# Patient Record
Sex: FEMALE | Employment: OTHER | URBAN - METROPOLITAN AREA
[De-identification: names, ages, dates, MRNs, and addresses within clinical notes are randomized per-mention and may not be internally consistent; named-entity substitution may affect disease eponyms.]

---

## 2017-05-12 ENCOUNTER — ANESTHESIA (OUTPATIENT)
Dept: SURGERY | Age: 77
DRG: 330 | End: 2017-05-12
Payer: MEDICARE

## 2017-05-12 ENCOUNTER — ANESTHESIA EVENT (OUTPATIENT)
Dept: SURGERY | Age: 77
DRG: 330 | End: 2017-05-12
Payer: MEDICARE

## 2017-05-12 ENCOUNTER — APPOINTMENT (OUTPATIENT)
Dept: CT IMAGING | Age: 77
DRG: 330 | End: 2017-05-12
Attending: PHYSICIAN ASSISTANT
Payer: MEDICARE

## 2017-05-12 ENCOUNTER — HOSPITAL ENCOUNTER (INPATIENT)
Age: 77
LOS: 13 days | Discharge: HOME HEALTH | DRG: 330 | End: 2017-05-25
Attending: EMERGENCY MEDICINE | Admitting: SURGERY
Payer: MEDICARE

## 2017-05-12 DIAGNOSIS — R10.31 ABDOMINAL PAIN, RIGHT LOWER QUADRANT: ICD-10-CM

## 2017-05-12 DIAGNOSIS — K37 APPENDICITIS, UNSPECIFIED APPENDICITIS TYPE: Primary | ICD-10-CM

## 2017-05-12 DIAGNOSIS — D3A.020 CARCINOID TUMOR OF APPENDIX: ICD-10-CM

## 2017-05-12 LAB
ABO + RH BLD: NORMAL
ALBUMIN SERPL BCP-MCNC: 3.5 G/DL (ref 3.5–5)
ALBUMIN/GLOB SERPL: 0.7 {RATIO} (ref 1.1–2.2)
ALP SERPL-CCNC: 43 U/L (ref 45–117)
ALT SERPL-CCNC: 30 U/L (ref 12–78)
ANION GAP BLD CALC-SCNC: 13 MMOL/L (ref 5–15)
APPEARANCE UR: ABNORMAL
AST SERPL W P-5'-P-CCNC: 29 U/L (ref 15–37)
ATRIAL RATE: 101 BPM
BACTERIA URNS QL MICRO: ABNORMAL /HPF
BASOPHILS # BLD AUTO: 0 K/UL (ref 0–0.1)
BASOPHILS # BLD: 0 % (ref 0–1)
BILIRUB SERPL-MCNC: 0.8 MG/DL (ref 0.2–1)
BILIRUB UR QL CFM: NEGATIVE
BLOOD GROUP ANTIBODIES SERPL: NORMAL
BUN SERPL-MCNC: 21 MG/DL (ref 6–20)
BUN/CREAT SERPL: 23 (ref 12–20)
CALCIUM SERPL-MCNC: 9.4 MG/DL (ref 8.5–10.1)
CALCULATED P AXIS, ECG09: 49 DEGREES
CALCULATED R AXIS, ECG10: -35 DEGREES
CALCULATED T AXIS, ECG11: 12 DEGREES
CHLORIDE SERPL-SCNC: 96 MMOL/L (ref 97–108)
CO2 SERPL-SCNC: 25 MMOL/L (ref 21–32)
COLOR UR: ABNORMAL
CREAT SERPL-MCNC: 0.92 MG/DL (ref 0.55–1.02)
DIAGNOSIS, 93000: NORMAL
EOSINOPHIL # BLD: 0.1 K/UL (ref 0–0.4)
EOSINOPHIL NFR BLD: 1 % (ref 0–7)
EPITH CASTS URNS QL MICRO: ABNORMAL /LPF
ERYTHROCYTE [DISTWIDTH] IN BLOOD BY AUTOMATED COUNT: 12.7 % (ref 11.5–14.5)
GLOBULIN SER CALC-MCNC: 4.8 G/DL (ref 2–4)
GLUCOSE SERPL-MCNC: 123 MG/DL (ref 65–100)
GLUCOSE UR STRIP.AUTO-MCNC: NEGATIVE MG/DL
HCT VFR BLD AUTO: 40.5 % (ref 35–47)
HGB BLD-MCNC: 14.3 G/DL (ref 11.5–16)
HGB UR QL STRIP: ABNORMAL
KETONES UR QL STRIP.AUTO: NEGATIVE MG/DL
LEUKOCYTE ESTERASE UR QL STRIP.AUTO: ABNORMAL
LIPASE SERPL-CCNC: 145 U/L (ref 73–393)
LYMPHOCYTES # BLD AUTO: 32 % (ref 12–49)
LYMPHOCYTES # BLD: 2.9 K/UL (ref 0.8–3.5)
MCH RBC QN AUTO: 32.6 PG (ref 26–34)
MCHC RBC AUTO-ENTMCNC: 35.3 G/DL (ref 30–36.5)
MCV RBC AUTO: 92.3 FL (ref 80–99)
MONOCYTES # BLD: 0.9 K/UL (ref 0–1)
MONOCYTES NFR BLD AUTO: 10 % (ref 5–13)
NEUTS SEG # BLD: 5.3 K/UL (ref 1.8–8)
NEUTS SEG NFR BLD AUTO: 57 % (ref 32–75)
NITRITE UR QL STRIP.AUTO: NEGATIVE
P-R INTERVAL, ECG05: 160 MS
PH UR STRIP: 6 [PH] (ref 5–8)
PLATELET # BLD AUTO: 276 K/UL (ref 150–400)
POTASSIUM SERPL-SCNC: 3.4 MMOL/L (ref 3.5–5.1)
PROT SERPL-MCNC: 8.3 G/DL (ref 6.4–8.2)
PROT UR STRIP-MCNC: 30 MG/DL
Q-T INTERVAL, ECG07: 364 MS
QRS DURATION, ECG06: 84 MS
QTC CALCULATION (BEZET), ECG08: 471 MS
RBC # BLD AUTO: 4.39 M/UL (ref 3.8–5.2)
RBC #/AREA URNS HPF: ABNORMAL /HPF (ref 0–5)
SODIUM SERPL-SCNC: 134 MMOL/L (ref 136–145)
SP GR UR REFRACTOMETRY: 1.02 (ref 1–1.03)
SPECIMEN EXP DATE BLD: NORMAL
UROBILINOGEN UR QL STRIP.AUTO: 4 EU/DL (ref 0.2–1)
VENTRICULAR RATE, ECG03: 101 BPM
WBC # BLD AUTO: 9.3 K/UL (ref 3.6–11)
WBC URNS QL MICRO: ABNORMAL /HPF (ref 0–4)

## 2017-05-12 PROCEDURE — C9290 INJ, BUPIVACAINE LIPOSOME: HCPCS | Performed by: SURGERY

## 2017-05-12 PROCEDURE — 74011250636 HC RX REV CODE- 250/636: Performed by: SURGERY

## 2017-05-12 PROCEDURE — 96375 TX/PRO/DX INJ NEW DRUG ADDON: CPT

## 2017-05-12 PROCEDURE — 74011000250 HC RX REV CODE- 250: Performed by: SURGERY

## 2017-05-12 PROCEDURE — 74011250636 HC RX REV CODE- 250/636

## 2017-05-12 PROCEDURE — 81001 URINALYSIS AUTO W/SCOPE: CPT | Performed by: PHYSICIAN ASSISTANT

## 2017-05-12 PROCEDURE — 88342 IMHCHEM/IMCYTCHM 1ST ANTB: CPT | Performed by: SURGERY

## 2017-05-12 PROCEDURE — 93005 ELECTROCARDIOGRAM TRACING: CPT

## 2017-05-12 PROCEDURE — 77030013079 HC BLNKT BAIR HGGR 3M -A: Performed by: ANESTHESIOLOGY

## 2017-05-12 PROCEDURE — 96361 HYDRATE IV INFUSION ADD-ON: CPT

## 2017-05-12 PROCEDURE — 77030018836 HC SOL IRR NACL ICUM -A: Performed by: SURGERY

## 2017-05-12 PROCEDURE — 77030008684 HC TU ET CUF COVD -B: Performed by: ANESTHESIOLOGY

## 2017-05-12 PROCEDURE — 0DTF4ZZ RESECTION OF RIGHT LARGE INTESTINE, PERCUTANEOUS ENDOSCOPIC APPROACH: ICD-10-PCS | Performed by: SURGERY

## 2017-05-12 PROCEDURE — 83690 ASSAY OF LIPASE: CPT | Performed by: PHYSICIAN ASSISTANT

## 2017-05-12 PROCEDURE — 77030002933 HC SUT MCRYL J&J -A: Performed by: SURGERY

## 2017-05-12 PROCEDURE — 77030008771 HC TU NG SALEM SUMP -A: Performed by: ANESTHESIOLOGY

## 2017-05-12 PROCEDURE — 36415 COLL VENOUS BLD VENIPUNCTURE: CPT | Performed by: PHYSICIAN ASSISTANT

## 2017-05-12 PROCEDURE — 77030019908 HC STETH ESOPH SIMS -A: Performed by: ANESTHESIOLOGY

## 2017-05-12 PROCEDURE — 74011000250 HC RX REV CODE- 250

## 2017-05-12 PROCEDURE — 74011636320 HC RX REV CODE- 636/320: Performed by: RADIOLOGY

## 2017-05-12 PROCEDURE — 77030035048 HC TRCR ENDOSC OPTCL COVD -B: Performed by: SURGERY

## 2017-05-12 PROCEDURE — 74011250636 HC RX REV CODE- 250/636: Performed by: PHYSICIAN ASSISTANT

## 2017-05-12 PROCEDURE — 77030026438 HC STYL ET INTUB CARD -A: Performed by: ANESTHESIOLOGY

## 2017-05-12 PROCEDURE — 76010000133 HC OR TIME 3 TO 3.5 HR: Performed by: SURGERY

## 2017-05-12 PROCEDURE — 77030012405 HC DRN WND ADLR -A: Performed by: SURGERY

## 2017-05-12 PROCEDURE — 77030008756 HC TU IRR SUC STRY -B: Performed by: SURGERY

## 2017-05-12 PROCEDURE — 99285 EMERGENCY DEPT VISIT HI MDM: CPT

## 2017-05-12 PROCEDURE — 65270000029 HC RM PRIVATE

## 2017-05-12 PROCEDURE — 77030020747 HC TU INSUF ENDOSC TELE -A: Performed by: SURGERY

## 2017-05-12 PROCEDURE — 77030009965 HC RELD STPLR ENDOS COVD -D: Performed by: SURGERY

## 2017-05-12 PROCEDURE — 77030022703 HC LIGASURE  BLNT LAPSCP SEAL COVD -E: Performed by: SURGERY

## 2017-05-12 PROCEDURE — 77030022704 HC SUT VLOC COVD -B: Performed by: SURGERY

## 2017-05-12 PROCEDURE — 77030011640 HC PAD GRND REM COVD -A: Performed by: SURGERY

## 2017-05-12 PROCEDURE — 77030035045 HC TRCR ENDOSC VRSPRT BLDLSS COVD -B: Performed by: SURGERY

## 2017-05-12 PROCEDURE — 77030035051: Performed by: SURGERY

## 2017-05-12 PROCEDURE — 86901 BLOOD TYPING SEROLOGIC RH(D): CPT | Performed by: SURGERY

## 2017-05-12 PROCEDURE — 77030002966 HC SUT PDS J&J -A: Performed by: SURGERY

## 2017-05-12 PROCEDURE — 74177 CT ABD & PELVIS W/CONTRAST: CPT

## 2017-05-12 PROCEDURE — 88309 TISSUE EXAM BY PATHOLOGIST: CPT | Performed by: SURGERY

## 2017-05-12 PROCEDURE — 76060000037 HC ANESTHESIA 3 TO 3.5 HR: Performed by: SURGERY

## 2017-05-12 PROCEDURE — 96365 THER/PROPH/DIAG IV INF INIT: CPT

## 2017-05-12 PROCEDURE — 74011000258 HC RX REV CODE- 258: Performed by: PHYSICIAN ASSISTANT

## 2017-05-12 PROCEDURE — 0DTJ4ZZ RESECTION OF APPENDIX, PERCUTANEOUS ENDOSCOPIC APPROACH: ICD-10-PCS | Performed by: SURGERY

## 2017-05-12 PROCEDURE — 76210000006 HC OR PH I REC 0.5 TO 1 HR: Performed by: SURGERY

## 2017-05-12 PROCEDURE — 85025 COMPLETE CBC W/AUTO DIFF WBC: CPT | Performed by: PHYSICIAN ASSISTANT

## 2017-05-12 PROCEDURE — 77030032490 HC SLV COMPR SCD KNE COVD -B: Performed by: SURGERY

## 2017-05-12 PROCEDURE — 77030031139 HC SUT VCRL2 J&J -A: Performed by: SURGERY

## 2017-05-12 PROCEDURE — 77030034849: Performed by: SURGERY

## 2017-05-12 PROCEDURE — 77030009852 HC PCH RTVR ENDOSC COVD -B: Performed by: SURGERY

## 2017-05-12 PROCEDURE — 80053 COMPREHEN METABOLIC PANEL: CPT | Performed by: PHYSICIAN ASSISTANT

## 2017-05-12 RX ORDER — LANOLIN ALCOHOL/MO/W.PET/CERES
500 CREAM (GRAM) TOPICAL DAILY
COMMUNITY

## 2017-05-12 RX ORDER — ONDANSETRON 2 MG/ML
4 INJECTION INTRAMUSCULAR; INTRAVENOUS
Status: COMPLETED | OUTPATIENT
Start: 2017-05-12 | End: 2017-05-12

## 2017-05-12 RX ORDER — SODIUM CHLORIDE 0.9 % (FLUSH) 0.9 %
5-10 SYRINGE (ML) INJECTION EVERY 8 HOURS
Status: DISCONTINUED | OUTPATIENT
Start: 2017-05-12 | End: 2017-05-12 | Stop reason: HOSPADM

## 2017-05-12 RX ORDER — HYDROMORPHONE HYDROCHLORIDE 2 MG/ML
INJECTION, SOLUTION INTRAMUSCULAR; INTRAVENOUS; SUBCUTANEOUS AS NEEDED
Status: DISCONTINUED | OUTPATIENT
Start: 2017-05-12 | End: 2017-05-12 | Stop reason: HOSPADM

## 2017-05-12 RX ORDER — ACETAMINOPHEN 325 MG/1
650 TABLET ORAL
Status: DISCONTINUED | OUTPATIENT
Start: 2017-05-13 | End: 2017-05-25 | Stop reason: HOSPADM

## 2017-05-12 RX ORDER — ONDANSETRON 2 MG/ML
INJECTION INTRAMUSCULAR; INTRAVENOUS AS NEEDED
Status: DISCONTINUED | OUTPATIENT
Start: 2017-05-12 | End: 2017-05-12 | Stop reason: HOSPADM

## 2017-05-12 RX ORDER — MIDAZOLAM HYDROCHLORIDE 1 MG/ML
INJECTION, SOLUTION INTRAMUSCULAR; INTRAVENOUS AS NEEDED
Status: DISCONTINUED | OUTPATIENT
Start: 2017-05-12 | End: 2017-05-12 | Stop reason: HOSPADM

## 2017-05-12 RX ORDER — SODIUM CHLORIDE 0.9 % (FLUSH) 0.9 %
5-10 SYRINGE (ML) INJECTION AS NEEDED
Status: DISCONTINUED | OUTPATIENT
Start: 2017-05-12 | End: 2017-05-12 | Stop reason: HOSPADM

## 2017-05-12 RX ORDER — VENLAFAXINE HYDROCHLORIDE 150 MG/1
150 CAPSULE, EXTENDED RELEASE ORAL
Status: DISCONTINUED | OUTPATIENT
Start: 2017-05-13 | End: 2017-05-25 | Stop reason: HOSPADM

## 2017-05-12 RX ORDER — HYDROMORPHONE HYDROCHLORIDE 1 MG/ML
.25-1 INJECTION, SOLUTION INTRAMUSCULAR; INTRAVENOUS; SUBCUTANEOUS
Status: DISCONTINUED | OUTPATIENT
Start: 2017-05-12 | End: 2017-05-12 | Stop reason: HOSPADM

## 2017-05-12 RX ORDER — HYDROMORPHONE HYDROCHLORIDE 1 MG/ML
0.5 INJECTION, SOLUTION INTRAMUSCULAR; INTRAVENOUS; SUBCUTANEOUS
Status: DISCONTINUED | OUTPATIENT
Start: 2017-05-12 | End: 2017-05-13

## 2017-05-12 RX ORDER — ENOXAPARIN SODIUM 100 MG/ML
40 INJECTION SUBCUTANEOUS EVERY 24 HOURS
Status: DISCONTINUED | OUTPATIENT
Start: 2017-05-13 | End: 2017-05-17

## 2017-05-12 RX ORDER — ROCURONIUM BROMIDE 10 MG/ML
INJECTION, SOLUTION INTRAVENOUS AS NEEDED
Status: DISCONTINUED | OUTPATIENT
Start: 2017-05-12 | End: 2017-05-12 | Stop reason: HOSPADM

## 2017-05-12 RX ORDER — PROPOFOL 10 MG/ML
INJECTION, EMULSION INTRAVENOUS AS NEEDED
Status: DISCONTINUED | OUTPATIENT
Start: 2017-05-12 | End: 2017-05-12 | Stop reason: HOSPADM

## 2017-05-12 RX ORDER — LIDOCAINE HYDROCHLORIDE 10 MG/ML
0.1 INJECTION, SOLUTION EPIDURAL; INFILTRATION; INTRACAUDAL; PERINEURAL AS NEEDED
Status: DISCONTINUED | OUTPATIENT
Start: 2017-05-12 | End: 2017-05-12 | Stop reason: HOSPADM

## 2017-05-12 RX ORDER — LIDOCAINE HYDROCHLORIDE 20 MG/ML
INJECTION, SOLUTION EPIDURAL; INFILTRATION; INTRACAUDAL; PERINEURAL AS NEEDED
Status: DISCONTINUED | OUTPATIENT
Start: 2017-05-12 | End: 2017-05-12 | Stop reason: HOSPADM

## 2017-05-12 RX ORDER — FENTANYL CITRATE 50 UG/ML
INJECTION, SOLUTION INTRAMUSCULAR; INTRAVENOUS AS NEEDED
Status: DISCONTINUED | OUTPATIENT
Start: 2017-05-12 | End: 2017-05-12 | Stop reason: HOSPADM

## 2017-05-12 RX ORDER — AMLODIPINE BESYLATE 5 MG/1
5 TABLET ORAL DAILY
COMMUNITY

## 2017-05-12 RX ORDER — HYDROCHLOROTHIAZIDE 25 MG/1
25 TABLET ORAL DAILY
COMMUNITY

## 2017-05-12 RX ORDER — SODIUM CHLORIDE, SODIUM LACTATE, POTASSIUM CHLORIDE, CALCIUM CHLORIDE 600; 310; 30; 20 MG/100ML; MG/100ML; MG/100ML; MG/100ML
INJECTION, SOLUTION INTRAVENOUS
Status: DISCONTINUED | OUTPATIENT
Start: 2017-05-12 | End: 2017-05-12 | Stop reason: HOSPADM

## 2017-05-12 RX ORDER — KETOROLAC TROMETHAMINE 30 MG/ML
15 INJECTION, SOLUTION INTRAMUSCULAR; INTRAVENOUS EVERY 6 HOURS
Status: DISCONTINUED | OUTPATIENT
Start: 2017-05-13 | End: 2017-05-16

## 2017-05-12 RX ORDER — FENOFIBRATE 150 MG/1
1 CAPSULE ORAL
COMMUNITY

## 2017-05-12 RX ORDER — AMLODIPINE BESYLATE 5 MG/1
5 TABLET ORAL DAILY
Status: DISCONTINUED | OUTPATIENT
Start: 2017-05-13 | End: 2017-05-25 | Stop reason: HOSPADM

## 2017-05-12 RX ORDER — LEVOTHYROXINE SODIUM 50 UG/1
50 TABLET ORAL
Status: DISCONTINUED | OUTPATIENT
Start: 2017-05-13 | End: 2017-05-25 | Stop reason: HOSPADM

## 2017-05-12 RX ORDER — GLYCOPYRROLATE 0.2 MG/ML
INJECTION INTRAMUSCULAR; INTRAVENOUS AS NEEDED
Status: DISCONTINUED | OUTPATIENT
Start: 2017-05-12 | End: 2017-05-12 | Stop reason: HOSPADM

## 2017-05-12 RX ORDER — MORPHINE SULFATE 2 MG/ML
4 INJECTION, SOLUTION INTRAMUSCULAR; INTRAVENOUS
Status: COMPLETED | OUTPATIENT
Start: 2017-05-12 | End: 2017-05-12

## 2017-05-12 RX ORDER — GABAPENTIN 300 MG/1
600 CAPSULE ORAL 3 TIMES DAILY
COMMUNITY

## 2017-05-12 RX ORDER — GABAPENTIN 300 MG/1
600 CAPSULE ORAL 3 TIMES DAILY
Status: DISCONTINUED | OUTPATIENT
Start: 2017-05-13 | End: 2017-05-25 | Stop reason: HOSPADM

## 2017-05-12 RX ORDER — HYDROCHLOROTHIAZIDE 25 MG/1
25 TABLET ORAL DAILY
Status: DISCONTINUED | OUTPATIENT
Start: 2017-05-13 | End: 2017-05-25 | Stop reason: HOSPADM

## 2017-05-12 RX ORDER — NEOSTIGMINE METHYLSULFATE 1 MG/ML
INJECTION INTRAVENOUS AS NEEDED
Status: DISCONTINUED | OUTPATIENT
Start: 2017-05-12 | End: 2017-05-12 | Stop reason: HOSPADM

## 2017-05-12 RX ORDER — ESOMEPRAZOLE MAGNESIUM 40 MG/1
40 CAPSULE, DELAYED RELEASE ORAL DAILY
COMMUNITY

## 2017-05-12 RX ORDER — SODIUM CHLORIDE 9 MG/ML
75 INJECTION, SOLUTION INTRAVENOUS CONTINUOUS
Status: DISCONTINUED | OUTPATIENT
Start: 2017-05-13 | End: 2017-05-16

## 2017-05-12 RX ORDER — SODIUM CHLORIDE, SODIUM LACTATE, POTASSIUM CHLORIDE, CALCIUM CHLORIDE 600; 310; 30; 20 MG/100ML; MG/100ML; MG/100ML; MG/100ML
1000 INJECTION, SOLUTION INTRAVENOUS CONTINUOUS
Status: DISCONTINUED | OUTPATIENT
Start: 2017-05-12 | End: 2017-05-12 | Stop reason: HOSPADM

## 2017-05-12 RX ORDER — SUCCINYLCHOLINE CHLORIDE 20 MG/ML
INJECTION INTRAMUSCULAR; INTRAVENOUS AS NEEDED
Status: DISCONTINUED | OUTPATIENT
Start: 2017-05-12 | End: 2017-05-12 | Stop reason: HOSPADM

## 2017-05-12 RX ORDER — LEVOTHYROXINE SODIUM 50 UG/1
50 TABLET ORAL
COMMUNITY

## 2017-05-12 RX ORDER — DEXAMETHASONE SODIUM PHOSPHATE 4 MG/ML
INJECTION, SOLUTION INTRA-ARTICULAR; INTRALESIONAL; INTRAMUSCULAR; INTRAVENOUS; SOFT TISSUE AS NEEDED
Status: DISCONTINUED | OUTPATIENT
Start: 2017-05-12 | End: 2017-05-12 | Stop reason: HOSPADM

## 2017-05-12 RX ORDER — VENLAFAXINE HYDROCHLORIDE 150 MG/1
150 TABLET, EXTENDED RELEASE ORAL DAILY
COMMUNITY

## 2017-05-12 RX ORDER — SODIUM CHLORIDE, SODIUM LACTATE, POTASSIUM CHLORIDE, CALCIUM CHLORIDE 600; 310; 30; 20 MG/100ML; MG/100ML; MG/100ML; MG/100ML
25 INJECTION, SOLUTION INTRAVENOUS CONTINUOUS
Status: DISCONTINUED | OUTPATIENT
Start: 2017-05-12 | End: 2017-05-12 | Stop reason: HOSPADM

## 2017-05-12 RX ORDER — PANTOPRAZOLE SODIUM 40 MG/1
40 TABLET, DELAYED RELEASE ORAL
Status: DISCONTINUED | OUTPATIENT
Start: 2017-05-13 | End: 2017-05-25 | Stop reason: HOSPADM

## 2017-05-12 RX ADMIN — NEOSTIGMINE METHYLSULFATE 2 MG: 1 INJECTION INTRAVENOUS at 22:23

## 2017-05-12 RX ADMIN — CEFOXITIN 2 G: 2 INJECTION, POWDER, FOR SOLUTION INTRAVENOUS at 18:15

## 2017-05-12 RX ADMIN — ONDANSETRON 4 MG: 2 INJECTION INTRAMUSCULAR; INTRAVENOUS at 19:53

## 2017-05-12 RX ADMIN — ROCURONIUM BROMIDE 5 MG: 10 INJECTION, SOLUTION INTRAVENOUS at 20:38

## 2017-05-12 RX ADMIN — FENTANYL CITRATE 100 MCG: 50 INJECTION, SOLUTION INTRAMUSCULAR; INTRAVENOUS at 19:35

## 2017-05-12 RX ADMIN — MIDAZOLAM HYDROCHLORIDE 2 MG: 1 INJECTION, SOLUTION INTRAMUSCULAR; INTRAVENOUS at 19:30

## 2017-05-12 RX ADMIN — DEXAMETHASONE SODIUM PHOSPHATE 4 MG: 4 INJECTION, SOLUTION INTRA-ARTICULAR; INTRALESIONAL; INTRAMUSCULAR; INTRAVENOUS; SOFT TISSUE at 19:53

## 2017-05-12 RX ADMIN — HYDROMORPHONE HYDROCHLORIDE 0.5 MG: 2 INJECTION, SOLUTION INTRAMUSCULAR; INTRAVENOUS; SUBCUTANEOUS at 22:00

## 2017-05-12 RX ADMIN — ROCURONIUM BROMIDE 25 MG: 10 INJECTION, SOLUTION INTRAVENOUS at 19:45

## 2017-05-12 RX ADMIN — SODIUM CHLORIDE, SODIUM LACTATE, POTASSIUM CHLORIDE, CALCIUM CHLORIDE: 600; 310; 30; 20 INJECTION, SOLUTION INTRAVENOUS at 20:09

## 2017-05-12 RX ADMIN — LIDOCAINE HYDROCHLORIDE 40 MG: 20 INJECTION, SOLUTION EPIDURAL; INFILTRATION; INTRACAUDAL; PERINEURAL at 19:35

## 2017-05-12 RX ADMIN — ROCURONIUM BROMIDE 10 MG: 10 INJECTION, SOLUTION INTRAVENOUS at 19:59

## 2017-05-12 RX ADMIN — FENTANYL CITRATE 50 MCG: 50 INJECTION, SOLUTION INTRAMUSCULAR; INTRAVENOUS at 20:00

## 2017-05-12 RX ADMIN — FENTANYL CITRATE 50 MCG: 50 INJECTION, SOLUTION INTRAMUSCULAR; INTRAVENOUS at 22:00

## 2017-05-12 RX ADMIN — SODIUM CHLORIDE, SODIUM LACTATE, POTASSIUM CHLORIDE, CALCIUM CHLORIDE: 600; 310; 30; 20 INJECTION, SOLUTION INTRAVENOUS at 19:10

## 2017-05-12 RX ADMIN — SODIUM CHLORIDE 1000 ML: 900 INJECTION, SOLUTION INTRAVENOUS at 15:24

## 2017-05-12 RX ADMIN — ROCURONIUM BROMIDE 5 MG: 10 INJECTION, SOLUTION INTRAVENOUS at 19:35

## 2017-05-12 RX ADMIN — Medication 4 MG: at 15:26

## 2017-05-12 RX ADMIN — FENTANYL CITRATE 50 MCG: 50 INJECTION, SOLUTION INTRAMUSCULAR; INTRAVENOUS at 20:21

## 2017-05-12 RX ADMIN — PROPOFOL 150 MG: 10 INJECTION, EMULSION INTRAVENOUS at 19:35

## 2017-05-12 RX ADMIN — SUCCINYLCHOLINE CHLORIDE 100 MG: 20 INJECTION INTRAMUSCULAR; INTRAVENOUS at 19:35

## 2017-05-12 RX ADMIN — GLYCOPYRROLATE 0.4 MG: 0.2 INJECTION INTRAMUSCULAR; INTRAVENOUS at 22:23

## 2017-05-12 RX ADMIN — ONDANSETRON 4 MG: 2 INJECTION INTRAMUSCULAR; INTRAVENOUS at 15:25

## 2017-05-12 RX ADMIN — IOPAMIDOL 100 ML: 755 INJECTION, SOLUTION INTRAVENOUS at 16:30

## 2017-05-12 RX ADMIN — SODIUM CHLORIDE, SODIUM LACTATE, POTASSIUM CHLORIDE, CALCIUM CHLORIDE: 600; 310; 30; 20 INJECTION, SOLUTION INTRAVENOUS at 22:26

## 2017-05-12 NOTE — PROGRESS NOTES
[facility]: MED RECONCILIATION    Comments/Recommendations:  Patient brought a list of medications she currently takes at home. No recommendations for changes to medications at this time. Medications added:     · Venlafaxine 150mg daily  · Cyanocobalamin 500mcg daily    Medications removed:    · None    Medications adjusted:    · None    Information obtained from:  Patient    Significant PMH/Disease States: There is no problem list on file for this patient. History reviewed. No pertinent past medical history. Chief Complaint for this Admission:   Chief Complaint   Patient presents with    Abdominal Pain     Allergies: Review of patient's allergies indicates no known allergies. Prior to Admission Medications:   Prior to Admission Medications   Prescriptions Last Dose Informant Patient Reported? Taking? Fenofibrate 150 mg cap 5/11/2017 at PM Self Yes Yes   Sig: Take 1 Cap by mouth nightly. Venlafaxine 150 mg tr24 5/12/2017 at AM Self Yes Yes   Sig: Take 150 mg by mouth daily. amLODIPine (NORVASC) 5 mg tablet 5/12/2017 at AM Self Yes Yes   Sig: Take 5 mg by mouth daily. cyanocobalamin (VITAMIN B-12) 500 mcg tablet 5/12/2017 at AM Self Yes Yes   Sig: Take 500 mcg by mouth daily. esomeprazole (NEXIUM) 40 mg capsule 5/12/2017 at AM Self Yes Yes   Sig: Take 40 mg by mouth daily. gabapentin (NEURONTIN) 300 mg capsule 5/12/2017 at 1200 Self Yes Yes   Sig: Take 600 mg by mouth three (3) times daily. hydroCHLOROthiazide (HYDRODIURIL) 25 mg tablet 5/12/2017 at AM Self Yes Yes   Sig: Take 25 mg by mouth daily. levothyroxine (SYNTHROID) 50 mcg tablet 5/12/2017 at AM Self Yes Yes   Sig: Take 50 mcg by mouth Daily (before breakfast).       Facility-Administered Medications: None     Payton Miranda, PharmD, BCPS  Contact:

## 2017-05-12 NOTE — IP AVS SNAPSHOT
[facility] 
 
 
 [address] 
[phone] Patient: Tyrese Stanley MRN: [id number] VFA:[id number] You are allergic to the following No active allergies Recent Documentation Height Weight Breastfeeding? BMI Smoking Status 1.524 m 63.5 kg No 27.34 kg/m2 Former Smoker Unresulted Labs Order Current Status CHROMOGRANIN A In process Emergency Contacts Name Discharge Info Relation Home Work Mobile Dejon Mtz  Spouse [3]   [phone] Miryam Snider  Child [2]   [phone] About your hospitalization You were admitted on:  May 12, 2017 You last received care in the:  [facility] 4M POST SURG ORT 2 You were discharged on:  May 25, 2017 Unit phone number:  [phone] Why you were hospitalized Your primary diagnosis was:  Not on File Providers Seen During Your Hospitalizations Provider Role Specialty Primary office phone David Olguin MD Attending Provider Emergency Medicine [phone] Manju Bliss MD Attending Provider General Surgery [phone] Your Primary Care Physician (PCP) Primary Care Physician Office Phone Office Fax OTHER, PHYS ** None ** ** None ** Follow-up Information Follow up With Details Comments Contact Info Manju Bliss MD   [address] [place] [address] [facility] [address] 
[phone] Phys MD Britney   Patient can only remember the practice name and not the physician Your Appointments Thursday May 25, 2017 To Be Determined START OF CARE with BAM Ramachandran [address] ([address] [facility]) [address] ([address] [facility]) Current Discharge Medication List  
  
START taking these medications Dose & Instructions Dispensing Information Comments Morning Noon Evening Bedtime  
 acetaminophen 325 mg tablet Commonly known as:  TYLENOL Your last dose was: Your next dose is:    
   
   
 Dose:  650 mg Take 2 Tabs by mouth Before breakfast, lunch, and dinner. Quantity:  60 Tab Refills:  0  
     
   
   
   
  
 amoxicillin-clavulanate 875-125 mg per tablet Commonly known as:  AUGMENTIN Your last dose was: Your next dose is:    
   
   
 Dose:  1 Tab Take 1 Tab by mouth every twelve (12) hours. Quantity:  14 Tab Refills:  0  
     
   
   
   
  
 oxyCODONE IR 5 mg immediate release tablet Commonly known as:  Shad Brewster Your last dose was: Your next dose is:    
   
   
 Dose:  2.5-5 mg Take 0.5-1 Tabs by mouth every four (4) hours as needed. Max Daily Amount: 30 mg.  
 Quantity:  30 Tab Refills:  0 CONTINUE these medications which have NOT CHANGED Dose & Instructions Dispensing Information Comments Morning Noon Evening Bedtime  
 amLODIPine 5 mg tablet Commonly known as:  Anurag Heck Your last dose was: Your next dose is:    
   
   
 Dose:  5 mg Take 5 mg by mouth daily. Refills:  0 Fenofibrate 150 mg Cap Your last dose was: Your next dose is:    
   
   
 Dose:  1 Cap Take 1 Cap by mouth nightly. Refills:  0  
     
   
   
   
  
 gabapentin 300 mg capsule Commonly known as:  NEURONTIN Your last dose was: Your next dose is:    
   
   
 Dose:  600 mg Take 600 mg by mouth three (3) times daily. Refills:  0  
     
   
   
   
  
 hydroCHLOROthiazide 25 mg tablet Commonly known as:  HYDRODIURIL Your last dose was: Your next dose is:    
   
   
 Dose:  25 mg Take 25 mg by mouth daily. Refills:  0  
     
   
   
   
  
 levothyroxine 50 mcg tablet Commonly known as:  SYNTHROID  
   
 Your last dose was: Your next dose is:    
   
   
 Dose:  50 mcg Take 50 mcg by mouth Daily (before breakfast). Refills:  0 NexIUM 40 mg capsule Generic drug:  esomeprazole Your last dose was: Your next dose is:    
   
   
 Dose:  40 mg Take 40 mg by mouth daily. Refills:  0 Venlafaxine 150 mg Tr24 Your last dose was: Your next dose is:    
   
   
 Dose:  150 mg Take 150 mg by mouth daily. Refills:  0  
     
   
   
   
  
 VITAMIN B-12 500 mcg tablet Generic drug:  cyanocobalamin Your last dose was: Your next dose is:    
   
   
 Dose:  500 mcg Take 500 mcg by mouth daily. Refills:  0 Where to Get Your Medications Information on where to get these meds will be given to you by the nurse or doctor. ! Ask your nurse or doctor about these medications  
  acetaminophen 325 mg tablet  
 amoxicillin-clavulanate 875-125 mg per tablet  
 oxyCODONE IR 5 mg immediate release tablet Discharge Instructions Patient Discharge Instructions Domo Horowitz / [id number] : 1940 Admitted 2017 Discharged: 2017 Take Home Medications · It is important that you take the medication exactly as they are prescribed. · Keep your medication in the bottles provided by the pharmacist and keep a list of the medication names, dosages, and times to be taken in your wallet. · Do not take other medications without consulting your doctor. · Do not drive, drink alcohol, or operate machinery when taking sedating pain medication. · Take colace daily while on pain medication to help prevent constipation. You may take over the counter laxatives such as Dulcolax or Miralax as needed for constipation What to do at [facility] Recommended diet: Regular Diet Recommended activity: No heavy lifting or strenuous activity for 4 weeks. You may shower. You may drive once pain has improved and you no longer require pain medication. Follow up with Dr. Keren Tabares next week for recheck. Once you return to [facility], you should follow up with your family doctor for recheck. Call Dr. Keren Tabares or go to the ER if you develop worsening pain, fever, vomiting, or any other symptoms that concern you. Information obtained by : 
I understand that if any problems occur once I am at home I am to contact my physician. I understand and acknowledge receipt of the instructions indicated above. Physician's or R.N.'s Signature                                                                  Date/Time Patient or Representative Signature                                                          Date/Time Discharge Orders None [facility] Announcement We are excited to announce that we are making your provider's discharge notes available to you in [facility]. You will see these notes when they are completed and signed by the physician that discharged you from your recent hospital stay. If you have any questions or concerns about any information you see in [facility], please call the Health Information Department where you were seen or reach out to your Primary Care Provider for more information about your plan of care. Introducing [facility]! Satya Sullivan introduces [facility] patient portal. Now you can access parts of your medical record, email your doctor's office, and request medication refills online. 1. In your internet browser, go to [email or web address] 2. Click on the First Time User? Click Here link in the Sign In box.  You will see the New Member Sign Up page. 3. Enter your [employer] Access Code exactly as it appears below. You will not need to use this code after youve completed the sign-up process. If you do not sign up before the expiration date, you must request a new code. · [employer] Access Code: [email or web address] Expires: 8/13/2017  7:53 AM 
 
4. Enter the last four digits of your Social Security Number (xxxx) and Date of Birth (mm/dd/yyyy) as indicated and click Submit. You will be taken to the next sign-up page. 5. Create a [employer] ID. This will be your [employer] login ID and cannot be changed, so think of one that is secure and easy to remember. 6. Create a [employer] password. You can change your password at any time. 7. Enter your Password Reset Question and Answer. This can be used at a later time if you forget your password. 8. Enter your e-mail address. You will receive e-mail notification when new information is available in [address] 9. Click Sign Up. You can now view and download portions of your medical record. 10. Click the Download Summary menu link to download a portable copy of your medical information. If you have questions, please visit the Frequently Asked Questions section of the [employer] website. Remember, [employer] is NOT to be used for urgent needs. For medical emergencies, dial 911. Now available from your iPhone and Android! General Information Please provide this summary of care documentation to your next provider. Patient Signature:  ____________________________________________________________ Date:  ____________________________________________________________  
  
Gabriela Medico Provider Signature:  ____________________________________________________________ Date:  ____________________________________________________________

## 2017-05-12 NOTE — ANESTHESIA PREPROCEDURE EVALUATION
Anesthetic History   No history of anesthetic complications            Review of Systems / Medical History  Patient summary reviewed and pertinent labs reviewed    Pulmonary          Smoker      Comments: Quit smoking Feb 2017   Neuro/Psych   Within defined limits           Cardiovascular    Hypertension              Exercise tolerance: >4 METS     GI/Hepatic/Renal     GERD           Endo/Other      Hypothyroidism       Other Findings              Physical Exam    Airway  Mallampati: III  TM Distance: 4 - 6 cm  Neck ROM: normal range of motion   Mouth opening: Normal     Cardiovascular    Rhythm: regular  Rate: normal         Dental    Dentition: Full lower dentures and Full upper dentures     Pulmonary  Breath sounds clear to auscultation               Abdominal         Other Findings            Anesthetic Plan    ASA: 3, emergent  Anesthesia type: general          Induction: RSI  Anesthetic plan and risks discussed with: Patient and Spouse

## 2017-05-12 NOTE — ED PROVIDER NOTES
HPI Comments: 69 y.o. female with no significant past medical history who presents from personal vehicle with  with chief complaint of abd pain. Pt c/o new onset of severe diffuse abd pain with secondary SOB that started ~4 days ago. Pt states that she has taken aleve for her pain but has not had any relief. Pt states that she had an normal BM today but her  notes that she has experienced chronic diarrhea for the past three years. Pt has h/o  sections (x3) and abd hysterectomy. Pt denies nausea, vomiting, dysuria, hematuria, blood in the stool, fever, and CP. There are no other acute medical concerns at this time. Social hx: former smoker--quit 1 month ago, (-)EtOH, (-)drug use    PCP: No primary care provider on file. Note written by Roberta Mendoza. Jose Juan Owen, as dictated by YOSEF Odom 2:48 PM      The history is provided by the patient and the spouse. History reviewed. No pertinent past medical history. Past Surgical History:   Procedure Laterality Date    HX APPENDECTOMY      HX HYSTEROSCOPY           History reviewed. No pertinent family history. Social History     Social History    Marital status:      Spouse name: N/A    Number of children: N/A    Years of education: N/A     Occupational History    Not on file. Social History Main Topics    Smoking status: Former Smoker    Smokeless tobacco: Not on file    Alcohol use No    Drug use: Not on file    Sexual activity: Not on file     Other Topics Concern    Not on file     Social History Narrative    No narrative on file         ALLERGIES: Review of patient's allergies indicates no known allergies. Review of Systems   Constitutional: Negative for chills and fever. HENT: Negative for ear pain and sore throat. Eyes: Negative for photophobia and pain. Respiratory: Positive for shortness of breath. Negative for chest tightness.     Cardiovascular: Negative for chest pain and leg swelling. Gastrointestinal: Positive for abdominal pain. Negative for blood in stool, diarrhea, nausea and vomiting. Genitourinary: Negative for dysuria and flank pain. Musculoskeletal: Negative for back pain and neck pain. Skin: Negative for rash and wound. Neurological: Negative for dizziness, light-headedness and headaches. Patient Vitals for the past 12 hrs:   Temp Pulse Resp BP SpO2   05/12/17 1715 - 83 - 146/61 94 %   05/12/17 1612 - 86 - - 93 %   05/12/17 1602 - 96 - - 91 %   05/12/17 1601 - 96 - - (!) 88 %   05/12/17 1545 - 84 14 131/80 (!) 88 %   05/12/17 1530 - 90 17 (!) 111/91 95 %   05/12/17 1515 - 91 28 (!) 149/95 93 %   05/12/17 1501 - - - 143/90 -   05/12/17 1455 - - - - 91 %   05/12/17 1450 - - - - 91 %   05/12/17 1418 98.1 °F (36.7 °C) (!) 108 22 134/70 (!) 89 %              Physical Exam   Constitutional: She is oriented to person, place, and time. She appears well-developed and well-nourished. No distress. anxious   HENT:   Head: Normocephalic and atraumatic. Right Ear: External ear normal.   Left Ear: External ear normal.   Nose: Nose normal.   Mouth/Throat: Oropharynx is clear and moist.   Eyes: Conjunctivae and EOM are normal. Pupils are equal, round, and reactive to light. Neck: Normal range of motion. Neck supple. Cardiovascular: Regular rhythm, normal heart sounds and intact distal pulses. Tachycardia present. Exam reveals no gallop and no friction rub. No murmur heard. Mild tachycardia   Pulmonary/Chest: Effort normal and breath sounds normal. No stridor. No respiratory distress. She has no wheezes. She has no rales. She exhibits no tenderness. Abdominal: Soft. Bowel sounds are normal. She exhibits no distension and no mass. There is tenderness (diffuse but more focal around the RLQ). There is no rebound and no guarding. Musculoskeletal: Normal range of motion. She exhibits no edema, tenderness or deformity.    (-)CVAT   Neurological: She is alert and oriented to person, place, and time. She has normal reflexes. No cranial nerve deficit. Coordination normal.   Skin: Skin is warm and dry. No rash noted. No erythema. No pallor. Psychiatric: She has a normal mood and affect. Her behavior is normal. Judgment and thought content normal.   Nursing note and vitals reviewed. Note written by Carlos Alberto Wolfe, as dictated by YOSEF Caruso 2:48 PM       MDM  Number of Diagnoses or Management Options  Abdominal pain, right lower quadrant:   Appendicitis, unspecified appendicitis type:      Amount and/or Complexity of Data Reviewed  Clinical lab tests: reviewed and ordered  Tests in the radiology section of CPT®: ordered and reviewed  Tests in the medicine section of CPT®: ordered and reviewed  Obtain history from someone other than the patient: yes ()  Review and summarize past medical records: yes  Independent visualization of images, tracings, or specimens: yes    Patient Progress  Patient progress: stable    ED Course       Procedures    3:00 PM  Discussed pt, sx, hx and current findings with Dr Ba Ruelas. He is in agreement with plan and will see pt  Junior Steinberg PA-C    CONSULT NOTE:  5:38 PM Tasneem Love MD spoke with Dr. Soumya Yoder, Consult for Surgery. Discussed available diagnostic tests and clinical findings. He is in agreement with care plans as outlined. Dr. Soumya Yoder will come and see the pt in the ED.      5:40 PM  Tasneem Love MD spoke with Dr. Soumya Yoder, Consult for Surgery. Discussed available diagnostic tests and clinical findings. He is in agreement with care plans as outlined.  He will see pt  YOSEF Scott    LABS COMPLETED AND REVIEWED:  Recent Results (from the past 12 hour(s))   EKG, 12 LEAD, INITIAL    Collection Time: 05/12/17  2:24 PM   Result Value Ref Range    Ventricular Rate 101 BPM    Atrial Rate 101 BPM    P-R Interval 160 ms    QRS Duration 84 ms    Q-T Interval 364 ms    QTC Calculation (Bezet) 471 ms    Calculated P Axis 49 degrees    Calculated R Axis -35 degrees    Calculated T Axis 12 degrees    Diagnosis       Sinus tachycardia with frequent premature ventricular complexes  Left axis deviation  Inferior infarct , age undetermined  Abnormal ECG  No previous ECGs available  Confirmed by Gayatri Lantigua MD, [address] ([id number]) on 5/12/2017 5:00:11 PM     CBC WITH AUTOMATED DIFF    Collection Time: 05/12/17  3:13 PM   Result Value Ref Range    WBC 9.3 3.6 - 11.0 K/uL    RBC 4.39 3.80 - 5.20 M/uL    HGB 14.3 11.5 - 16.0 g/dL    HCT 40.5 35.0 - 47.0 %    MCV 92.3 80.0 - 99.0 FL    MCH 32.6 26.0 - 34.0 PG    MCHC 35.3 30.0 - 36.5 g/dL    RDW 12.7 11.5 - 14.5 %    PLATELET 130 841 - 745 K/uL    NEUTROPHILS 57 32 - 75 %    LYMPHOCYTES 32 12 - 49 %    MONOCYTES 10 5 - 13 %    EOSINOPHILS 1 0 - 7 %    BASOPHILS 0 0 - 1 %    ABS. NEUTROPHILS 5.3 1.8 - 8.0 K/UL    ABS. LYMPHOCYTES 2.9 0.8 - 3.5 K/UL    ABS. MONOCYTES 0.9 0.0 - 1.0 K/UL    ABS. EOSINOPHILS 0.1 0.0 - 0.4 K/UL    ABS. BASOPHILS 0.0 0.0 - 0.1 K/UL   METABOLIC PANEL, COMPREHENSIVE    Collection Time: 05/12/17  3:13 PM   Result Value Ref Range    Sodium 134 (L) 136 - 145 mmol/L    Potassium 3.4 (L) 3.5 - 5.1 mmol/L    Chloride 96 (L) 97 - 108 mmol/L    CO2 25 21 - 32 mmol/L    Anion gap 13 5 - 15 mmol/L    Glucose 123 (H) 65 - 100 mg/dL    BUN 21 (H) 6 - 20 MG/DL    Creatinine 0.92 0.55 - 1.02 MG/DL    BUN/Creatinine ratio 23 (H) 12 - 20      GFR est AA >60 >60 ml/min/1.73m2    GFR est non-AA 59 (L) >60 ml/min/1.73m2    Calcium 9.4 8.5 - 10.1 MG/DL    Bilirubin, total 0.8 0.2 - 1.0 MG/DL    ALT (SGPT) 30 12 - 78 U/L    AST (SGOT) 29 15 - 37 U/L    Alk.  phosphatase 43 (L) 45 - 117 U/L    Protein, total 8.3 (H) 6.4 - 8.2 g/dL    Albumin 3.5 3.5 - 5.0 g/dL    Globulin 4.8 (H) 2.0 - 4.0 g/dL    A-G Ratio 0.7 (L) 1.1 - 2.2     LIPASE    Collection Time: 05/12/17  3:13 PM   Result Value Ref Range    Lipase 145 73 - 393 U/L   URINALYSIS W/ RFLX MICROSCOPIC Collection Time: 05/12/17  4:20 PM   Result Value Ref Range    Color DENIZ      Appearance CLOUDY (A) CLEAR      Specific gravity 1.023 1.003 - 1.030      pH (UA) 6.0 5.0 - 8.0      Protein 30 (A) NEG mg/dL    Glucose NEGATIVE  NEG mg/dL    Ketone NEGATIVE  NEG mg/dL    Blood TRACE (A) NEG      Urobilinogen 4.0 (H) 0.2 - 1.0 EU/dL    Nitrites NEGATIVE  NEG      Leukocyte Esterase SMALL (A) NEG      WBC 10-20 0 - 4 /hpf    RBC 5-10 0 - 5 /hpf    Epithelial cells MANY (A) FEW /lpf    Bacteria 1+ (A) NEG /hpf   BILIRUBIN, CONFIRM    Collection Time: 05/12/17  4:20 PM   Result Value Ref Range    Bilirubin UA, confirm NEGATIVE  NEG         IMAGING COMPLETED AND REVIEWED:  The following have been ordered and reviewed:  Study Result      INDICATION: rlq abd TTP     COMPARISON: None     TECHNIQUE:   Following the uneventful intravenous administration of 100 cc Isovue-370, thin  axial images were obtained through the abdomen and pelvis. Coronal and sagittal  reconstructions were generated. Oral contrast was not administered. CT dose  reduction was achieved through use of a standardized protocol tailored for this  examination and automatic exposure control for dose modulation.     FINDINGS:   LUNG BASES: Chronic interstitial changes in the lung bases. LIVER: No mass or biliary dilatation. GALLBLADDER: Unremarkable. SPLEEN: No enlargement or lesion. PANCREAS: No mass or ductal dilatation. ADRENALS: No mass. KIDNEYS: No mass, calculus, or hydronephrosis. GI TRACT: No evidence of bowel obstruction. Difficult to assess for bowel wall  thickening given lack of oral contrast material. Stomach is nondistended  accounting for diffuse gastric wall prominence  PERITONEUM: No free air or free fluid. APPENDIX: Enlarged, thickened and heterogeneous in overall appearance with some  fluid in the mid to distal lumen although the base of the appendix is hyperdense  and appears to represent soft tissue fullness.  There is mild surrounding  inflammatory stranding. The maximum transverse dimension of the appendix is 1.9  cm with maximum wall thickness of 0.5 cm. RETROPERITONEUM: No lymphadenopathy or aortic aneurysm.     REPRODUCTIVE ORGANS: Uterus is surgically absent. URINARY BLADDER: 3 mm calcification within the dependent portion of the urinary  bladder just left of midline. LYMPH NODES: None enlarged. FREE FLUID: None. BONES: No destructive bone lesion. ADDITIONAL COMMENTS: N/A.     IMPRESSION  IMPRESSION:  Abnormal appendix, which is dilated, thickened with mild surrounding  inflammatory stranding most consistent with appendicitis. However, there is  unusual nodular thickening at the base of the appendix as well as in the  proximal to mid appendiceal wall. Thus, cannot completely exclude a neoplastic  appendiceal process. MEDICATIONS GIVEN:  Medications   cefOXitin (MEFOXIN) 2 g in 0.9% sodium chloride (MBP/ADV) 100 mL (not administered)   morphine injection 4 mg (4 mg IntraVENous Given 5/12/17 1526)   ondansetron (ZOFRAN) injection 4 mg (4 mg IntraVENous Given 5/12/17 1525)   sodium chloride 0.9 % bolus infusion 1,000 mL (0 mL IntraVENous IV Completed 5/12/17 1719)   iopamidol (ISOVUE-370) 76 % injection 100 mL (100 mL IntraVENous Given 5/12/17 1630)         CLINICAL IMPRESSION:  1. Appendicitis, unspecified appendicitis type    2. Abdominal pain, right lower quadrant          Plan  1. Admission per Dr. Manisha Mejía      5:41 PM  The patient is being admitted to the hospital.  The results of their tests and reasons for their admission have been discussed with them and/or available family. The patient/family has conveyed agreement and understanding for the need to be admitted and for their admission diagnosis. Consultation has been made with the inpatient physician specialist for hospitalization.

## 2017-05-12 NOTE — IP AVS SNAPSHOT
Current Discharge Medication List  
  
START taking these medications Dose & Instructions Dispensing Information Comments Morning Noon Evening Bedtime  
 acetaminophen 325 mg tablet Commonly known as:  TYLENOL Your last dose was: Your next dose is:    
   
   
 Dose:  650 mg Take 2 Tabs by mouth Before breakfast, lunch, and dinner. Quantity:  60 Tab Refills:  0  
     
   
   
   
  
 amoxicillin-clavulanate 875-125 mg per tablet Commonly known as:  AUGMENTIN Your last dose was: Your next dose is:    
   
   
 Dose:  1 Tab Take 1 Tab by mouth every twelve (12) hours. Quantity:  14 Tab Refills:  0  
     
   
   
   
  
 oxyCODONE IR 5 mg immediate release tablet Commonly known as:  Israel Jordan Your last dose was: Your next dose is:    
   
   
 Dose:  2.5-5 mg Take 0.5-1 Tabs by mouth every four (4) hours as needed. Max Daily Amount: 30 mg.  
 Quantity:  30 Tab Refills:  0 CONTINUE these medications which have NOT CHANGED Dose & Instructions Dispensing Information Comments Morning Noon Evening Bedtime  
 amLODIPine 5 mg tablet Commonly known as:  Lauren Genao Your last dose was: Your next dose is:    
   
   
 Dose:  5 mg Take 5 mg by mouth daily. Refills:  0 Fenofibrate 150 mg Cap Your last dose was: Your next dose is:    
   
   
 Dose:  1 Cap Take 1 Cap by mouth nightly. Refills:  0  
     
   
   
   
  
 gabapentin 300 mg capsule Commonly known as:  NEURONTIN Your last dose was: Your next dose is:    
   
   
 Dose:  600 mg Take 600 mg by mouth three (3) times daily. Refills:  0  
     
   
   
   
  
 hydroCHLOROthiazide 25 mg tablet Commonly known as:  HYDRODIURIL Your last dose was: Your next dose is:    
   
   
 Dose:  25 mg Take 25 mg by mouth daily. Refills:  0 levothyroxine 50 mcg tablet Commonly known as:  SYNTHROID Your last dose was: Your next dose is:    
   
   
 Dose:  50 mcg Take 50 mcg by mouth Daily (before breakfast). Refills:  0 NexIUM 40 mg capsule Generic drug:  esomeprazole Your last dose was: Your next dose is:    
   
   
 Dose:  40 mg Take 40 mg by mouth daily. Refills:  0 Venlafaxine 150 mg Tr24 Your last dose was: Your next dose is:    
   
   
 Dose:  150 mg Take 150 mg by mouth daily. Refills:  0  
     
   
   
   
  
 VITAMIN B-12 500 mcg tablet Generic drug:  cyanocobalamin Your last dose was: Your next dose is:    
   
   
 Dose:  500 mcg Take 500 mcg by mouth daily. Refills:  0 Where to Get Your Medications Information on where to get these meds will be given to you by the nurse or doctor. ! Ask your nurse or doctor about these medications  
  acetaminophen 325 mg tablet  
 amoxicillin-clavulanate 875-125 mg per tablet  
 oxyCODONE IR 5 mg immediate release tablet

## 2017-05-12 NOTE — ROUTINE PROCESS
TRANSFER - IN REPORT:    Verbal report received from [address] [place] [address] Drive RN(name) on Shelley Bolaños  being received from ED(unit) for routine post - op      Report consisted of patients Situation, Background, Assessment and   Recommendations(SBAR). Information from the following report(s) SBAR and ED Summary was reviewed with the receiving nurse. Opportunity for questions and clarification was provided. Assessment completed upon patients arrival to unit and care assumed.

## 2017-05-12 NOTE — ED TRIAGE NOTES
Patient reports generalized abdominal pain since Tuesday. Denies N/V/D or constipation. No fever. Patient reports SOB, sats 89% in triage.

## 2017-05-13 LAB
ANION GAP BLD CALC-SCNC: 12 MMOL/L (ref 5–15)
BASOPHILS # BLD AUTO: 0 K/UL (ref 0–0.1)
BASOPHILS # BLD: 0 % (ref 0–1)
BUN SERPL-MCNC: 13 MG/DL (ref 6–20)
BUN/CREAT SERPL: 16 (ref 12–20)
CALCIUM SERPL-MCNC: 8.5 MG/DL (ref 8.5–10.1)
CHLORIDE SERPL-SCNC: 100 MMOL/L (ref 97–108)
CO2 SERPL-SCNC: 25 MMOL/L (ref 21–32)
CREAT SERPL-MCNC: 0.82 MG/DL (ref 0.55–1.02)
EOSINOPHIL # BLD: 0 K/UL (ref 0–0.4)
EOSINOPHIL NFR BLD: 0 % (ref 0–7)
ERYTHROCYTE [DISTWIDTH] IN BLOOD BY AUTOMATED COUNT: 13.1 % (ref 11.5–14.5)
GLUCOSE SERPL-MCNC: 126 MG/DL (ref 65–100)
HCT VFR BLD AUTO: 38.9 % (ref 35–47)
HGB BLD-MCNC: 13.3 G/DL (ref 11.5–16)
LYMPHOCYTES # BLD AUTO: 13 % (ref 12–49)
LYMPHOCYTES # BLD: 1 K/UL (ref 0.8–3.5)
MCH RBC QN AUTO: 32.2 PG (ref 26–34)
MCHC RBC AUTO-ENTMCNC: 34.2 G/DL (ref 30–36.5)
MCV RBC AUTO: 94.2 FL (ref 80–99)
MONOCYTES # BLD: 0.5 K/UL (ref 0–1)
MONOCYTES NFR BLD AUTO: 6 % (ref 5–13)
NEUTS SEG # BLD: 6.7 K/UL (ref 1.8–8)
NEUTS SEG NFR BLD AUTO: 81 % (ref 32–75)
PLATELET # BLD AUTO: 226 K/UL (ref 150–400)
POTASSIUM SERPL-SCNC: 3.6 MMOL/L (ref 3.5–5.1)
RBC # BLD AUTO: 4.13 M/UL (ref 3.8–5.2)
SODIUM SERPL-SCNC: 137 MMOL/L (ref 136–145)
WBC # BLD AUTO: 8.2 K/UL (ref 3.6–11)

## 2017-05-13 PROCEDURE — 74011250636 HC RX REV CODE- 250/636: Performed by: SURGERY

## 2017-05-13 PROCEDURE — 77030027138 HC INCENT SPIROMETER -A

## 2017-05-13 PROCEDURE — 36415 COLL VENOUS BLD VENIPUNCTURE: CPT | Performed by: SURGERY

## 2017-05-13 PROCEDURE — 74011250637 HC RX REV CODE- 250/637: Performed by: SURGERY

## 2017-05-13 PROCEDURE — 85025 COMPLETE CBC W/AUTO DIFF WBC: CPT | Performed by: SURGERY

## 2017-05-13 PROCEDURE — 77010033678 HC OXYGEN DAILY

## 2017-05-13 PROCEDURE — 74011000258 HC RX REV CODE- 258: Performed by: SURGERY

## 2017-05-13 PROCEDURE — 80048 BASIC METABOLIC PNL TOTAL CA: CPT | Performed by: SURGERY

## 2017-05-13 PROCEDURE — 65270000029 HC RM PRIVATE

## 2017-05-13 RX ORDER — ONDANSETRON 2 MG/ML
4 INJECTION INTRAMUSCULAR; INTRAVENOUS
Status: DISCONTINUED | OUTPATIENT
Start: 2017-05-13 | End: 2017-05-25 | Stop reason: HOSPADM

## 2017-05-13 RX ORDER — HYDROMORPHONE HYDROCHLORIDE 1 MG/ML
1 INJECTION, SOLUTION INTRAMUSCULAR; INTRAVENOUS; SUBCUTANEOUS
Status: DISCONTINUED | OUTPATIENT
Start: 2017-05-13 | End: 2017-05-14

## 2017-05-13 RX ADMIN — KETOROLAC TROMETHAMINE 15 MG: 30 INJECTION, SOLUTION INTRAMUSCULAR at 02:12

## 2017-05-13 RX ADMIN — ENOXAPARIN SODIUM 40 MG: 40 INJECTION SUBCUTANEOUS at 06:26

## 2017-05-13 RX ADMIN — PIPERACILLIN SODIUM,TAZOBACTAM SODIUM 3.38 G: 3; .375 INJECTION, POWDER, FOR SOLUTION INTRAVENOUS at 18:05

## 2017-05-13 RX ADMIN — HYDROMORPHONE HYDROCHLORIDE 0.5 MG: 1 INJECTION, SOLUTION INTRAMUSCULAR; INTRAVENOUS; SUBCUTANEOUS at 07:30

## 2017-05-13 RX ADMIN — HYDROMORPHONE HYDROCHLORIDE 0.5 MG: 1 INJECTION, SOLUTION INTRAMUSCULAR; INTRAVENOUS; SUBCUTANEOUS at 02:12

## 2017-05-13 RX ADMIN — SODIUM CHLORIDE 75 ML/HR: 900 INJECTION, SOLUTION INTRAVENOUS at 02:15

## 2017-05-13 RX ADMIN — VENLAFAXINE HYDROCHLORIDE 150 MG: 150 CAPSULE, EXTENDED RELEASE ORAL at 08:54

## 2017-05-13 RX ADMIN — ONDANSETRON 4 MG: 2 INJECTION INTRAMUSCULAR; INTRAVENOUS at 11:33

## 2017-05-13 RX ADMIN — ACETAMINOPHEN 650 MG: 325 TABLET ORAL at 15:30

## 2017-05-13 RX ADMIN — GABAPENTIN 600 MG: 300 CAPSULE ORAL at 22:39

## 2017-05-13 RX ADMIN — GABAPENTIN 600 MG: 300 CAPSULE ORAL at 15:30

## 2017-05-13 RX ADMIN — ACETAMINOPHEN 650 MG: 325 TABLET ORAL at 06:31

## 2017-05-13 RX ADMIN — PIPERACILLIN SODIUM,TAZOBACTAM SODIUM 3.38 G: 3; .375 INJECTION, POWDER, FOR SOLUTION INTRAVENOUS at 03:04

## 2017-05-13 RX ADMIN — ACETAMINOPHEN 650 MG: 325 TABLET ORAL at 10:45

## 2017-05-13 RX ADMIN — KETOROLAC TROMETHAMINE 15 MG: 30 INJECTION, SOLUTION INTRAMUSCULAR at 11:32

## 2017-05-13 RX ADMIN — SODIUM CHLORIDE 75 ML/HR: 900 INJECTION, SOLUTION INTRAVENOUS at 16:14

## 2017-05-13 RX ADMIN — PIPERACILLIN SODIUM,TAZOBACTAM SODIUM 3.38 G: 3; .375 INJECTION, POWDER, FOR SOLUTION INTRAVENOUS at 10:44

## 2017-05-13 RX ADMIN — KETOROLAC TROMETHAMINE 15 MG: 30 INJECTION, SOLUTION INTRAMUSCULAR at 23:50

## 2017-05-13 RX ADMIN — KETOROLAC TROMETHAMINE 15 MG: 30 INJECTION, SOLUTION INTRAMUSCULAR at 18:06

## 2017-05-13 RX ADMIN — LEVOTHYROXINE SODIUM 50 MCG: 0.05 TABLET ORAL at 06:31

## 2017-05-13 RX ADMIN — AMLODIPINE BESYLATE 5 MG: 5 TABLET ORAL at 09:01

## 2017-05-13 RX ADMIN — KETOROLAC TROMETHAMINE 15 MG: 30 INJECTION, SOLUTION INTRAMUSCULAR at 06:27

## 2017-05-13 RX ADMIN — HYDROMORPHONE HYDROCHLORIDE 1 MG: 1 INJECTION, SOLUTION INTRAMUSCULAR; INTRAVENOUS; SUBCUTANEOUS at 16:48

## 2017-05-13 RX ADMIN — GABAPENTIN 600 MG: 300 CAPSULE ORAL at 08:54

## 2017-05-13 RX ADMIN — PANTOPRAZOLE SODIUM 40 MG: 40 TABLET, DELAYED RELEASE ORAL at 06:31

## 2017-05-13 RX ADMIN — HYDROMORPHONE HYDROCHLORIDE 1 MG: 1 INJECTION, SOLUTION INTRAMUSCULAR; INTRAVENOUS; SUBCUTANEOUS at 22:40

## 2017-05-13 NOTE — PERIOP NOTES
Called floor and spoke to Emiliana--Shelby RN will be receiving pt; notified floor pt will be arriving in 10-15 min--floor acknowledged.

## 2017-05-13 NOTE — OP NOTES
OPERATIVE NOTE    Date of Procedure: 5/12/2017   Preoperative Diagnosis: APPENDICITIS  Postoperative Diagnosis: APPENDICEAL MASS    Procedure(s):  LAPAROSCOPIC MOBILIZATION HEPATIC FLEXURE  LAPAROSCOPIC RIGHT COLECTOMY  Surgeon(s) and Role:     * Anita Montilla MD - Primary         Assistant Staff:       Surgical Staff:  Circ-1: Ebonie Tuttle, BAM; Marc Stuart, BAM  Scrub Tech-1: Micchidiel Milton  Surg Asst-1: Vijay Moore RN  Surg Asst-Relief: Aleyda Palma  Event Time In   Incision Start 1954   Incision Close 2237     Anesthesia: General   Estimated Blood Loss: Min  Specimens:   ID Type Source Tests Collected by Time Destination   1 : Charna Shoulders, APPENDIX, AND RIGHT COLON Preservative   Anita Montilla MD 5/12/2017 2004 Pathology      Findings: Thickened cecum at base of appendix. No perforation. Complications: none  Implants: * No implants in log *      Indication:  See electronic. Procedure:  After consent was obtained, the patient was taken back to the operating room and placed in a supine position. A time out was completed verifying correct patient, procedure, site, positioning and any special equipment needs. After induction of monitored anesthesia a waters catheter was placed. The abdomen was prepped with 2% chlorhexidine solution and draped in normal sterile fashion. The abdomen was accessed through a right para-umbilical 5mm incision under controlled endoscopic vision through a blunt dissection port. 15mm Hg pneumoperitoneum was established and maintained. There was no visual laparoscopic evidence of damage to any structures around the entrance area. Additional 5mm trocar and 12mm ports were placed under direct laparoscopic vision in the left upper and left lower abdomen, respectively. The patient was placed in 30 degree reverse trendelenburg position with left tilt. The 1.2cm dilated appendix was in clear view and distended. The base of the appendix was thickened.   Glistening surfaces of viscera were unremarkable. Diaphragm, liver, spleen and stomach were unremarkable. Decision was made to perform right colectomy due to the proximity of terminal ileum to the appendiceal base. The cecum was grasped and lifted to tent the ileocolic artery. The congenital attachments of the terminal ileum to the retroperitoneum were taken down with the laparoscopic Ligasure tissue sealing device. The incised peritoneum of the terminal ileum was then followed distally onto the white line of Toldt. Once the plane was opened, a combination of blunt dissection and countertension allowed lateral to medial dissection. The plane between right colon mesentery and perinephric tissue was seen clearly up to the hepatic flexure. The right ureter was positively identified at it's usual location along the pelvic brim and protected. Second portion of duodenum was visualized and protected. The patient was then placed in reverse Trendelenburg. The assistant then placed gentle traction on the midline gastrocolic omentum as I elevated the stomach. I entered the lesser sac by dividing the thin peritoneum with the ligasure between the colon and distal stomach. There was no evidence that the duodenal sweep was near this point. I extended this dissection to the patient's right towards the hepatic flexure attachments with the laparoscopic tissue sealer in the same prior fashion ultimately encountering the prior dissection plane and taking down the hepatic flexure. With gentle traction on the peritoneum of the transverse colon and gentle sweeps, the transverse colon mobilized centrally and medially. I returned the colon again to it's native anatomic position. With gentle traction, we rotated the specimen medially to complete the medial mesocolon dissection. The duodenal sweep was well visualized and protected. The hepatic flexure was well mobilized.    The right colon and proximal transverse colon was then retracted laterally towards the right lateral abdominal wall to expose the ileocolic pedicle, as well as the right branch of the middle colic vessel. The mesentery was scored. The terminal ileum was divided first, approximately 15cm from the cecum with a 60mm DEANGELO purple load. The base of the ileocolic artery was identified and a high ligation was performed with 45mm EndoGIA tan load. Transverse colon was then taken, sparing right branch of the middle colic artery with a 52AP DEANGELO purple load. The terminal ileum and transverse colon were then brought together in isoperistaltic fashion intracorporeally. Two stay sutures placed 5cm apart directly apposed the antimesenteric terminal ileum and the taenia directly opposite. Enterotomies were then made at the stapled end of the terminal ileum and 7 cm distal to the stapled end of the colon. The stapler was placed through the enterotomies, down their barrels. A side-to-side isoperistaltic ileocolonic anastomosis was then created using a second 60-mm DEANGELO stapler with purple load. The anastomotic staple line was examined for hemostasis and patency. There was no evidence of intraluminal or extraluminal bleeding or leaking. The common enterotomy was closed with intracoporeal laparoscopic double layer closure with 3-0 unidirectional PDS suture. All areas of dissection the anastomosis were inspected and found to be hemostatic and satisfactory. The orientation of the anastomosis was appropriate. The patient's abdomen was irrigated with 2 liters of sterile water until clear. The mesenteric defect was closed with a running 3-0 unidirectional suture. Sponge, instrument and needle counts were correct. Extraction site was closed with transfascial 0 Vicryl sutures. Pneumoperitoneum was terminally released. The skin incisions were closed with 4-0 absorbable monofilament suture, steristrips and tegaderm. The patient was then extubated having tolerated the procedure well.  I went to discuss my findings with her family in the waiting area.      Bulmaro Anderson MD

## 2017-05-13 NOTE — PROGRESS NOTES
Primary Nurse Christiana Jay RN and BAM Barnes performed a dual skin assessment on this patient No impairment noted  TRANSFER - IN REPORT:    Verbal report received from [address] [place] [address] Rd on Surekha Guardado  being received from 4th floor for routine post - op      Report consisted of patients Situation, Background, Assessment and   Recommendations(SBAR). Information from the following report(s) Kardex, Procedure Summary, MAR, Accordion and Med Rec Status was reviewed with the receiving nurse. Opportunity for questions and clarification was provided. Assessment completed upon patients arrival to unit and care assumed.

## 2017-05-13 NOTE — ROUTINE PROCESS
TRANSFER - OUT REPORT:    Verbal report given to BAM Ryan(name) on Caitie   being transferred to [facility](unit) for routine post - op       Report consisted of patients Situation, Background, Assessment and   Recommendations(SBAR). Information from the following report(s) SBAR and MAR was reviewed with the receiving nurse. Opportunity for questions and clarification was provided.       Patient transported with:   O2 @ 2 liters  Registered Nurse

## 2017-05-13 NOTE — PROGRESS NOTES
Pain not adequately controlled with dilaudid every 6 hours  Nauseated also  abd mild distension  Afebrile  Increase dilaudid to 1 mg q4h prn and add zofran

## 2017-05-13 NOTE — BRIEF OP NOTE
BRIEF OPERATIVE NOTE    Date of Procedure: 5/12/2017   Preoperative Diagnosis: APPENDICITIS  Postoperative Diagnosis: APPENDICEAL MASS    Procedure(s):  LAPAROSCOPIC MOBILIZATION HEPATIC FLEXURE  LAPAROSCOPIC RIGHT COLECTOMY  Surgeon(s) and Role:     * Romeo Delgado MD - Primary         Assistant Staff:       Surgical Staff:  Circ-1: Pepito Young, BAM; Deric Gonsales, BAM  Scrub Tech-1: Deni Easley  Surg Asst-1: Sindy Bence, RN  Surg Asst-Relief: Orval Altagracia  Event Time In   Incision Start 1954   Incision Close 2237     Anesthesia: General   Estimated Blood Loss: Min  Specimens:   ID Type Source Tests Collected by Time Destination   1 : Burt Haagensen, APPENDIX, AND RIGHT COLON Preservative   Romeo Delgado MD 5/12/2017 2004 Pathology      Findings: Thickened cecum at base of appendix. No perforation.    Complications: none  Implants: * No implants in log *

## 2017-05-13 NOTE — PROGRESS NOTES
Bedside and Verbal shift change report given to [address] [place] [address] (oncoming nurse) by [facility] (offgoing nurse). Report included the following information SBAR, Kardex, Procedure Summary, Intake/Output, MAR and Recent Results.

## 2017-05-14 LAB
ANION GAP BLD CALC-SCNC: 8 MMOL/L (ref 5–15)
BASOPHILS # BLD AUTO: 0 K/UL (ref 0–0.1)
BASOPHILS # BLD AUTO: NORMAL K/UL (ref 0–0.1)
BASOPHILS # BLD: 0 % (ref 0–1)
BASOPHILS # BLD: NORMAL % (ref 0–1)
BUN SERPL-MCNC: 19 MG/DL (ref 6–20)
BUN/CREAT SERPL: 20 (ref 12–20)
CALCIUM SERPL-MCNC: 7.6 MG/DL (ref 8.5–10.1)
CHLORIDE SERPL-SCNC: 102 MMOL/L (ref 97–108)
CO2 SERPL-SCNC: 27 MMOL/L (ref 21–32)
CREAT SERPL-MCNC: 0.94 MG/DL (ref 0.55–1.02)
DIFFERENTIAL METHOD BLD: NORMAL
EOSINOPHIL # BLD: 0.2 K/UL (ref 0–0.4)
EOSINOPHIL # BLD: NORMAL K/UL (ref 0–0.4)
EOSINOPHIL NFR BLD: 2 % (ref 0–7)
EOSINOPHIL NFR BLD: NORMAL % (ref 0–7)
ERYTHROCYTE [DISTWIDTH] IN BLOOD BY AUTOMATED COUNT: 13.6 % (ref 11.5–14.5)
ERYTHROCYTE [DISTWIDTH] IN BLOOD BY AUTOMATED COUNT: NORMAL % (ref 11.5–14.5)
GLUCOSE SERPL-MCNC: 87 MG/DL (ref 65–100)
HCT VFR BLD AUTO: 32.2 % (ref 35–47)
HCT VFR BLD AUTO: NORMAL % (ref 35–47)
HGB BLD-MCNC: 10.9 G/DL (ref 11.5–16)
HGB BLD-MCNC: NORMAL G/DL (ref 11.5–16)
LYMPHOCYTES # BLD AUTO: 16 % (ref 12–49)
LYMPHOCYTES # BLD AUTO: NORMAL % (ref 12–49)
LYMPHOCYTES # BLD: 1.3 K/UL (ref 0.8–3.5)
LYMPHOCYTES # BLD: NORMAL K/UL (ref 0.8–3.5)
MCH RBC QN AUTO: 32.5 PG (ref 26–34)
MCH RBC QN AUTO: NORMAL PG (ref 26–34)
MCHC RBC AUTO-ENTMCNC: 33.9 G/DL (ref 30–36.5)
MCHC RBC AUTO-ENTMCNC: NORMAL G/DL (ref 30–36.5)
MCV RBC AUTO: 96.1 FL (ref 80–99)
MCV RBC AUTO: NORMAL FL (ref 80–99)
MONOCYTES # BLD: 0.4 K/UL (ref 0–1)
MONOCYTES # BLD: NORMAL K/UL (ref 0–1)
MONOCYTES NFR BLD AUTO: 5 % (ref 5–13)
MONOCYTES NFR BLD AUTO: NORMAL % (ref 5–13)
NEUTS SEG # BLD: 6.3 K/UL (ref 1.8–8)
NEUTS SEG # BLD: NORMAL K/UL (ref 1.8–8)
NEUTS SEG NFR BLD AUTO: 77 % (ref 32–75)
NEUTS SEG NFR BLD AUTO: NORMAL % (ref 32–75)
PLATELET # BLD AUTO: 191 K/UL (ref 150–400)
PLATELET # BLD AUTO: NORMAL K/UL (ref 150–400)
POTASSIUM SERPL-SCNC: 4 MMOL/L (ref 3.5–5.1)
RBC # BLD AUTO: 3.35 M/UL (ref 3.8–5.2)
RBC # BLD AUTO: NORMAL M/UL (ref 3.8–5.2)
SODIUM SERPL-SCNC: 137 MMOL/L (ref 136–145)
WBC # BLD AUTO: 8.2 K/UL (ref 3.6–11)
WBC # BLD AUTO: NORMAL K/UL (ref 3.6–11)

## 2017-05-14 PROCEDURE — 36415 COLL VENOUS BLD VENIPUNCTURE: CPT | Performed by: SURGERY

## 2017-05-14 PROCEDURE — 85025 COMPLETE CBC W/AUTO DIFF WBC: CPT | Performed by: SURGERY

## 2017-05-14 PROCEDURE — 65270000029 HC RM PRIVATE

## 2017-05-14 PROCEDURE — 74011000258 HC RX REV CODE- 258: Performed by: SURGERY

## 2017-05-14 PROCEDURE — 74011250637 HC RX REV CODE- 250/637: Performed by: SURGERY

## 2017-05-14 PROCEDURE — 77010033678 HC OXYGEN DAILY

## 2017-05-14 PROCEDURE — 74011250636 HC RX REV CODE- 250/636: Performed by: SURGERY

## 2017-05-14 PROCEDURE — 80048 BASIC METABOLIC PNL TOTAL CA: CPT | Performed by: SURGERY

## 2017-05-14 RX ORDER — HYDROCODONE BITARTRATE AND ACETAMINOPHEN 5; 325 MG/1; MG/1
1 TABLET ORAL
Status: DISCONTINUED | OUTPATIENT
Start: 2017-05-14 | End: 2017-05-15

## 2017-05-14 RX ORDER — HYDROMORPHONE HYDROCHLORIDE 1 MG/ML
0.5 INJECTION, SOLUTION INTRAMUSCULAR; INTRAVENOUS; SUBCUTANEOUS
Status: DISCONTINUED | OUTPATIENT
Start: 2017-05-14 | End: 2017-05-25 | Stop reason: HOSPADM

## 2017-05-14 RX ADMIN — KETOROLAC TROMETHAMINE 15 MG: 30 INJECTION, SOLUTION INTRAMUSCULAR at 06:11

## 2017-05-14 RX ADMIN — PIPERACILLIN SODIUM,TAZOBACTAM SODIUM 3.38 G: 3; .375 INJECTION, POWDER, FOR SOLUTION INTRAVENOUS at 04:14

## 2017-05-14 RX ADMIN — LEVOTHYROXINE SODIUM 50 MCG: 0.05 TABLET ORAL at 06:10

## 2017-05-14 RX ADMIN — ENOXAPARIN SODIUM 40 MG: 40 INJECTION SUBCUTANEOUS at 06:08

## 2017-05-14 RX ADMIN — KETOROLAC TROMETHAMINE 15 MG: 30 INJECTION, SOLUTION INTRAMUSCULAR at 23:56

## 2017-05-14 RX ADMIN — ACETAMINOPHEN 650 MG: 325 TABLET ORAL at 11:25

## 2017-05-14 RX ADMIN — ACETAMINOPHEN 650 MG: 325 TABLET ORAL at 06:09

## 2017-05-14 RX ADMIN — ACETAMINOPHEN 650 MG: 325 TABLET ORAL at 15:19

## 2017-05-14 RX ADMIN — KETOROLAC TROMETHAMINE 15 MG: 30 INJECTION, SOLUTION INTRAMUSCULAR at 18:20

## 2017-05-14 RX ADMIN — GABAPENTIN 600 MG: 300 CAPSULE ORAL at 15:19

## 2017-05-14 RX ADMIN — KETOROLAC TROMETHAMINE 15 MG: 30 INJECTION, SOLUTION INTRAMUSCULAR at 11:25

## 2017-05-14 RX ADMIN — HYDROMORPHONE HYDROCHLORIDE 1 MG: 1 INJECTION, SOLUTION INTRAMUSCULAR; INTRAVENOUS; SUBCUTANEOUS at 06:36

## 2017-05-14 RX ADMIN — PIPERACILLIN SODIUM,TAZOBACTAM SODIUM 3.38 G: 3; .375 INJECTION, POWDER, FOR SOLUTION INTRAVENOUS at 18:20

## 2017-05-14 RX ADMIN — GABAPENTIN 600 MG: 300 CAPSULE ORAL at 22:20

## 2017-05-14 RX ADMIN — PIPERACILLIN SODIUM,TAZOBACTAM SODIUM 3.38 G: 3; .375 INJECTION, POWDER, FOR SOLUTION INTRAVENOUS at 10:15

## 2017-05-14 RX ADMIN — VENLAFAXINE HYDROCHLORIDE 150 MG: 150 CAPSULE, EXTENDED RELEASE ORAL at 08:47

## 2017-05-14 RX ADMIN — PANTOPRAZOLE SODIUM 40 MG: 40 TABLET, DELAYED RELEASE ORAL at 06:10

## 2017-05-14 RX ADMIN — GABAPENTIN 600 MG: 300 CAPSULE ORAL at 08:47

## 2017-05-14 RX ADMIN — HYDROCODONE BITARTRATE AND ACETAMINOPHEN 1 TABLET: 5; 325 TABLET ORAL at 22:20

## 2017-05-14 NOTE — PROGRESS NOTES
Doing well and pain better controlled  Afebrile and vitals stable  abd is benign   Not ready to advance diet

## 2017-05-15 PROCEDURE — 97116 GAIT TRAINING THERAPY: CPT

## 2017-05-15 PROCEDURE — 74011250637 HC RX REV CODE- 250/637: Performed by: PHYSICIAN ASSISTANT

## 2017-05-15 PROCEDURE — 74011000258 HC RX REV CODE- 258: Performed by: SURGERY

## 2017-05-15 PROCEDURE — 74011250637 HC RX REV CODE- 250/637: Performed by: SURGERY

## 2017-05-15 PROCEDURE — 97530 THERAPEUTIC ACTIVITIES: CPT

## 2017-05-15 PROCEDURE — 74011250636 HC RX REV CODE- 250/636: Performed by: SURGERY

## 2017-05-15 PROCEDURE — 97161 PT EVAL LOW COMPLEX 20 MIN: CPT

## 2017-05-15 PROCEDURE — 65270000029 HC RM PRIVATE

## 2017-05-15 RX ORDER — OXYCODONE HYDROCHLORIDE 5 MG/1
10 TABLET ORAL
Status: DISCONTINUED | OUTPATIENT
Start: 2017-05-15 | End: 2017-05-15

## 2017-05-15 RX ORDER — OXYCODONE HYDROCHLORIDE 5 MG/1
5 TABLET ORAL
Status: DISCONTINUED | OUTPATIENT
Start: 2017-05-15 | End: 2017-05-25 | Stop reason: HOSPADM

## 2017-05-15 RX ORDER — DIAZEPAM 10 MG/2ML
2.5 INJECTION INTRAMUSCULAR
Status: DISCONTINUED | OUTPATIENT
Start: 2017-05-15 | End: 2017-05-18

## 2017-05-15 RX ORDER — OXYCODONE HYDROCHLORIDE 5 MG/1
2.5 TABLET ORAL
Status: DISCONTINUED | OUTPATIENT
Start: 2017-05-15 | End: 2017-05-25 | Stop reason: HOSPADM

## 2017-05-15 RX ADMIN — KETOROLAC TROMETHAMINE 15 MG: 30 INJECTION, SOLUTION INTRAMUSCULAR at 23:52

## 2017-05-15 RX ADMIN — ACETAMINOPHEN 650 MG: 325 TABLET ORAL at 09:41

## 2017-05-15 RX ADMIN — PIPERACILLIN SODIUM,TAZOBACTAM SODIUM 3.38 G: 3; .375 INJECTION, POWDER, FOR SOLUTION INTRAVENOUS at 01:26

## 2017-05-15 RX ADMIN — PIPERACILLIN SODIUM,TAZOBACTAM SODIUM 3.38 G: 3; .375 INJECTION, POWDER, FOR SOLUTION INTRAVENOUS at 09:26

## 2017-05-15 RX ADMIN — LEVOTHYROXINE SODIUM 50 MCG: 0.05 TABLET ORAL at 09:21

## 2017-05-15 RX ADMIN — PANTOPRAZOLE SODIUM 40 MG: 40 TABLET, DELAYED RELEASE ORAL at 09:41

## 2017-05-15 RX ADMIN — ACETAMINOPHEN 650 MG: 325 TABLET ORAL at 17:22

## 2017-05-15 RX ADMIN — AMLODIPINE BESYLATE 5 MG: 5 TABLET ORAL at 09:22

## 2017-05-15 RX ADMIN — VENLAFAXINE HYDROCHLORIDE 150 MG: 150 CAPSULE, EXTENDED RELEASE ORAL at 09:22

## 2017-05-15 RX ADMIN — GABAPENTIN 600 MG: 300 CAPSULE ORAL at 09:21

## 2017-05-15 RX ADMIN — KETOROLAC TROMETHAMINE 15 MG: 30 INJECTION, SOLUTION INTRAMUSCULAR at 17:22

## 2017-05-15 RX ADMIN — PIPERACILLIN SODIUM,TAZOBACTAM SODIUM 3.38 G: 3; .375 INJECTION, POWDER, FOR SOLUTION INTRAVENOUS at 17:22

## 2017-05-15 RX ADMIN — GABAPENTIN 600 MG: 300 CAPSULE ORAL at 17:21

## 2017-05-15 RX ADMIN — GABAPENTIN 600 MG: 300 CAPSULE ORAL at 21:13

## 2017-05-15 RX ADMIN — HYDROCHLOROTHIAZIDE 25 MG: 25 TABLET ORAL at 09:23

## 2017-05-15 RX ADMIN — ENOXAPARIN SODIUM 40 MG: 40 INJECTION SUBCUTANEOUS at 04:23

## 2017-05-15 RX ADMIN — KETOROLAC TROMETHAMINE 15 MG: 30 INJECTION, SOLUTION INTRAMUSCULAR at 12:07

## 2017-05-15 RX ADMIN — OXYCODONE HYDROCHLORIDE 2.5 MG: 5 TABLET ORAL at 20:14

## 2017-05-15 RX ADMIN — ACETAMINOPHEN 650 MG: 325 TABLET ORAL at 12:07

## 2017-05-15 NOTE — PROGRESS NOTES
General Surgery Daily Progress Note    Patient: Prasad Kwok MRN: [id number]  SSN: [id number]    YOB: 1940  Age: 69 y.o. Sex: female      Admit Date: 5/12/2017    Subjective:   Reports moderate abdominal pain but she is reluctant to take pain medication. Per RN, patient confused this morning but re-oriented fairly well. She is tolerating clears, denies N/V. Reports BM and flatus.      Current Facility-Administered Medications   Medication Dose Route Frequency    oxyCODONE IR (ROXICODONE) tablet 2.5 mg  2.5 mg Oral Q4H PRN    oxyCODONE IR (ROXICODONE) tablet 10 mg  10 mg Oral Q4H PRN    diazePAM (VALIUM) injection 2.5 mg  2.5 mg IntraVENous Q4H PRN    HYDROmorphone (PF) (DILAUDID) injection 0.5 mg  0.5 mg IntraVENous Q4H PRN    ondansetron (ZOFRAN) injection 4 mg  4 mg IntraVENous Q6H PRN    amLODIPine (NORVASC) tablet 5 mg  5 mg Oral DAILY    pantoprazole (PROTONIX) tablet 40 mg  40 mg Oral ACB    gabapentin (NEURONTIN) capsule 600 mg  600 mg Oral TID    hydroCHLOROthiazide (HYDRODIURIL) tablet 25 mg  25 mg Oral DAILY    levothyroxine (SYNTHROID) tablet 50 mcg  50 mcg Oral ACB    ketorolac (TORADOL) injection 15 mg  15 mg IntraVENous Q6H    0.9% sodium chloride infusion  75 mL/hr IntraVENous CONTINUOUS    acetaminophen (TYLENOL) tablet 650 mg  650 mg Oral TIDAC    enoxaparin (LOVENOX) injection 40 mg  40 mg SubCUTAneous Q24H    venlafaxine-SR (EFFEXOR-XR) capsule 150 mg  150 mg Oral DAILY WITH BREAKFAST    piperacillin-tazobactam (ZOSYN) 3.375 g in 0.9% sodium chloride (MBP/ADV) 100 mL MBP  3.375 g IntraVENous Q8H        Objective:      05/13 1901 - 05/15 0700  In: -   Out: 1270 [Urine:850; Drains:420]  Patient Vitals for the past 8 hrs:   BP Temp Pulse Resp SpO2   05/15/17 0829 151/70 98.1 °F (36.7 °C) 89 18 93 %   05/15/17 0433 126/73 97.9 °F (36.6 °C) 96 18 90 %       Physical Exam:  General: Alert, cooperative, NAD  Lungs: Unlabored  Heart:  Regular rate and rhythm  Abdomen: Soft, ATTP, mildly distended. + bowel sounds. Incisions c/d/i. VALERIA drain SS. Extremities: Warm, moves all, no edema  Skin:  Warm and dry, no rash    Labs: Recent Labs      05/14/17   0615   WBC  8.2   HGB  10.9*   HCT  32.2*   PLT  191     Recent Labs      05/14/17   0615   05/12/17   1513   NA  137   < >  134*   K  4.0   < >  3.4*   CL  102   < >  96*   CO2  27   < >  25   GLU  87   < >  123*   BUN  19   < >  21*   CREA  0.94   < >  0.92   CA  7.6*   < >  9.4   ALB   --    --   3.5   TBILI   --    --   0.8   SGOT   --    --   29   ALT   --    --   30    < > = values in this interval not displayed. Assessment / Plan:   · POD#3 lap right colectomy  · Advance diet  · Needs better pain control as she is reluctant to mobilize d/t, but also seems to be high risk for delirium. Continue scheduled Tylenol and Toradol. Low dose neftali and valium PRN for pain. · PT/OT consults to assist with mobilization.    · AM labs pending   · Pathology pending  · Will complete ABX today

## 2017-05-15 NOTE — ROUTINE PROCESS
Verbal shift change report given to Usman Man (oncoming nurse) by George Colorado RN (offgoing nurse). Report included the following information SBAR, Kardex, OR Summary, Intake/Output and MAR.

## 2017-05-15 NOTE — PROGRESS NOTES
Problem: Mobility Impaired (Adult and Pediatric)  Goal: *Acute Goals and Plan of Care (Insert Text)  Physical Therapy Goals  Initiated 5/15/2017  1. Patient will move from supine to sit and sit to supine , scoot up and down and roll side to side in bed with independence within 7 day(s). 2. Patient will transfer from bed to chair and chair to bed with modified independence using the least restrictive device within 7 day(s). 3. Patient will perform sit to stand with modified independence within 7 day(s). 4. Patient will ambulate with modified independence for 300 feet with the least restrictive device within 7 day(s). 5. Patient will ascend/descend 5 stairs with one handrail(s) with minimal assistance/contact guard assist within 7 day(s). PHYSICAL THERAPY EVALUATION  Patient: Mary Jane Vital (35 y.o. female)  Date: 5/15/2017  Primary Diagnosis: APPENDICITIS  Appendicitis  Procedure(s) (LRB):  LAPAROSCOPIC TAKE DOWN OF SPLENIC FLEXURE  AND RIGHT COLECTOMY (N/A) 3 Days Post-Op   Precautions: fall, AMS         ASSESSMENT :  Based on the objective data described below, the patient presents with abdominal pain 8/10 after recent abdominal surgery limiting mobility. Pt's  is present. Pt has L foot hypersensitivity and decreased ankle range of motion after multiple surgeries. Pt states she has arthritic pain in all her joints and that she has had both rotator cuffs repaired. Pt is confused at times, demanding, agitated, very impulsive and likes to be in control. Pt moaned loudly and had great difficulty sitting edge of bed and then stated she could do it herself and did so with moderate assist. Pt did not desaturate with activity on room air. Pt was rude and often did not follow commands. Pt was impulsive, walking fast and removing her  socks with her feet and demanding her \"sneakers\" but refused to sit in a chair to don same.  Pt put on her flip flops and walked with accelerated pace with rolling walker and minimal assist /CGA. Verbal cues for safety were mostly ignored and Pt picked up walker at times, at times darted walker from side to side. After walking about 200 feet, Pt was returned to supine, instructed in B LE exercises and in fall prevention, and bed alarm was activated. Pt's  states Pt is usually very sedentary and that she can be very demanding but is more confused at present. Pt was ambulating independently but has fallen once in the past 12 months. Pt lives in [place] but will stay with son after discharge. Patient will benefit from skilled intervention to address the above impairments. Patients rehabilitation potential is considered to be Good  Factors which may influence rehabilitation potential include:   [ ]         None noted  [X]         Mental ability/status  [X]         Medical condition  [ ]         Home/family situation and support systems  [ ]         Safety awareness  [X]         Pain tolerance/management  [ ]         Other:        PLAN :  Recommendations and Planned Interventions:  [X]           Bed Mobility Training             [ ]    Neuromuscular Re-Education  [X]           Transfer Training                   [ ]    Orthotic/Prosthetic Training  [X]           Gait Training                         [ ]    Modalities  [X]           Therapeutic Exercises           [ ]    Edema Management/Control  [X]           Therapeutic Activities            [X]    Patient and Family Training/Education  [ ]           Other (comment):     Frequency/Duration: Patient will be followed by physical therapy  5 times a week to address goals. Discharge Recommendations: None  Further Equipment Recommendations for Discharge: may need a rolling walker       SUBJECTIVE:   Patient stated I can't walk - the pain is too bad. They did this to me.       OBJECTIVE DATA SUMMARY:   HISTORY:    Past Medical History:   Diagnosis Date    Hypertension      Thyroid disease       Past Surgical History:   Procedure Laterality Date    HX HYSTERECTOMY        HX ORTHOPAEDIC         mult sx on L ankle     Prior Level of Function/Home Situation: Pt was an independent community distance ambulator, was independent in ADL's, and has fallen once in the past year. Personal factors and/or comorbidities impacting plan of care: Demanding, irritable person  Home Situation  Home Environment: Private residence (will go to son's house)  # Steps to Enter: 5  Rails to Enter: Yes  Hand Rails : Bilateral  Wheelchair Ramp: No  One/Two Story Residence: Two story, live on 1st floor  # of Interior Steps: 13  Interior Rails: Right  Lift Chair Available: No  Living Alone: No  Patient Expects to be Discharged to[de-identified] Private residence  Current DME Used/Available at Home: Cane, straight  Tub or Shower Type: Shower     EXAMINATION/PRESENTATION/DECISION MAKING:   Critical Behavior:  Neurologic State: Agitated, Alert  Orientation Level: Oriented to person, Oriented to place  Cognition: Impulsive, Decreased command following, Poor safety awareness     Hearing: Auditory  Auditory Impairment: None  Skin:  dressing in place  Edema: not noted  Range Of Motion:  AROM: Generally decreased, functional                       Strength:    Strength: Generally decreased, functional                    Tone & Sensation:   Tone: Normal              Sensation: Impaired (L foot hypersensitive; peripheral neuropathy B feet)               Coordination:     Vision:      Functional Mobility:  Bed Mobility:     Supine to Sit: Additional time;Assist x1; Moderate assistance  Sit to Supine: Independent  Scooting: Independent  Transfers:  Sit to Stand: Stand-by asssistance  Stand to Sit: Stand-by asssistance                       Balance:   Sitting: Intact  Standing: Impaired  Standing - Static: Fair  Standing - Dynamic : Fair  Ambulation/Gait Training:  Distance (ft): 200 Feet (ft)  Assistive Device: Gait belt;Walker, rolling (or hand held assist)  Ambulation - Level of Assistance: Assist x1;Contact guard assistance;Minimal assistance     Gait Description (WDL): Exceptions to WDL  Gait Abnormalities: Path deviations              Speed/Kena: Fluctuations; Accelerated                                              Stairs: Therapeutic Exercises:   Instructed in ankle pumps and heel glides and encouraged to exercise hourly     Functional Measure:  Barthel Index:      Bathin  Bladder: 5  Bowels: 0  Groomin  Dressin  Feeding: 10  Mobility: 10  Stairs: 0  Toilet Use: 5  Transfer (Bed to Chair and Back): 10  Total: 50         Barthel and G-code impairment scale:  Percentage of impairment CH  0% CI  1-19% CJ  20-39% CK  40-59% CL  60-79% CM  80-99% CN  100%   Barthel Score 0-100 100 99-80 79-60 59-40 20-39 1-19    0   Barthel Score 0-20 20 17-19 13-16 9-12 5-8 1-4 0      The Barthel ADL Index: Guidelines  1. The index should be used as a record of what a patient does, not as a record of what a patient could do. 2. The main aim is to establish degree of independence from any help, physical or verbal, however minor and for whatever reason. 3. The need for supervision renders the patient not independent. 4. A patient's performance should be established using the best available evidence. Asking the patient, friends/relatives and nurses are the usual sources, but direct observation and common sense are also important. However direct testing is not needed. 5. Usually the patient's performance over the preceding 24-48 hours is important, but occasionally longer periods will be relevant. 6. Middle categories imply that the patient supplies over 50 per cent of the effort. 7. Use of aids to be independent is allowed. Eugenia Trinidad, Barthel, D.W. (425). Functional evaluation: the Barthel Index. [address] [facility] (14)2. Emma Bitter charles Cesar, KAREN, Elissa Ortega., Eliezer Persaud., [place], [address] ().  Measuring the change indisability after inpatient rehabilitation; comparison of the responsiveness of the Barthel Index and Functional [place] Measure. Journal of Neurology, Neurosurgery, and Psychiatry, 66(4), 817-006. AILYN Delcid.MARCELLA, ANITA Hidalgo, & Alejandro Londnoo M.A. (2004.) Assessment of post-stroke quality of life in cost-effectiveness studies: The usefulness of the Barthel Index and the EuroQoL-5D. Quality of Life Research, 13, 314-10         G codes: In compliance with CMSs Claims Based Outcome Reporting, the following G-code set was chosen for this patient based on their primary functional limitation being treated: The outcome measure chosen to determine the severity of the functional limitation was the Barthel Index with a score of 50/100 which was correlated with the impairment scale. · Mobility - Walking and Moving Around:               - CURRENT STATUS:    CK - 40%-59% impaired, limited or restricted               - GOAL STATUS:           CJ - 20%-39% impaired, limited or restricted               - D/C STATUS:                       ---------------To be determined---------------      Physical Therapy Evaluation Charge Determination   History Examination Presentation Decision-Making   MEDIUM  Complexity : 1-2 comorbidities / personal factors will impact the outcome/ POC  LOW Complexity : 1-2 Standardized tests and measures addressing body structure, function, activity limitation and / or participation in recreation  MEDIUM Complexity : Evolving with changing characteristics  Other outcome measures Barthel Index MEDIUM      Based on the above components, the patient evaluation is determined to be of the following complexity level: LOW      Pain:                    Activity Tolerance:   good  Please refer to the flowsheet for vital signs taken during this treatment.   After treatment:   [ ]         Patient left in no apparent distress sitting up in chair  [X]         Patient left in no apparent distress in bed  [X]         Call small left within reach  [X]         Nursing notified  [X]         Caregiver present  [X]         Bed alarm activated      COMMUNICATION/EDUCATION:   The patients plan of care was discussed with: Registered Nurse.  [X]         Fall prevention education was provided and the patient/caregiver indicated understanding. [X]         Patient/family have participated as able in goal setting and plan of care. [X]         Patient/family agree to work toward stated goals and plan of care. [ ]         Patient understands intent and goals of therapy, but is neutral about his/her participation. [ ]         Patient is unable to participate in goal setting and plan of care.      Thank you for this referral.  Katty Plunkett, PT   Time Calculation: 28 mins

## 2017-05-15 NOTE — PROGRESS NOTES
Pt confused this morning, she kept pullig out IV several times after pranav reconnected it and refused all morning meds and lab work discussed the importance of compliance and benefits of BW and meds but she still refused. Will endorse to morning tour to attempt to give meds and draw labs.

## 2017-05-16 LAB
ANION GAP BLD CALC-SCNC: 7 MMOL/L (ref 5–15)
BASOPHILS # BLD AUTO: 0 K/UL (ref 0–0.1)
BASOPHILS # BLD: 0 % (ref 0–1)
BUN SERPL-MCNC: 8 MG/DL (ref 6–20)
BUN/CREAT SERPL: 11 (ref 12–20)
CALCIUM SERPL-MCNC: 8 MG/DL (ref 8.5–10.1)
CHLORIDE SERPL-SCNC: 104 MMOL/L (ref 97–108)
CO2 SERPL-SCNC: 29 MMOL/L (ref 21–32)
CREAT SERPL-MCNC: 0.74 MG/DL (ref 0.55–1.02)
EOSINOPHIL # BLD: 0.1 K/UL (ref 0–0.4)
EOSINOPHIL NFR BLD: 2 % (ref 0–7)
ERYTHROCYTE [DISTWIDTH] IN BLOOD BY AUTOMATED COUNT: 12.9 % (ref 11.5–14.5)
GLUCOSE SERPL-MCNC: 101 MG/DL (ref 65–100)
HCT VFR BLD AUTO: 31 % (ref 35–47)
HGB BLD-MCNC: 10.5 G/DL (ref 11.5–16)
LYMPHOCYTES # BLD AUTO: 19 % (ref 12–49)
LYMPHOCYTES # BLD: 1.2 K/UL (ref 0.8–3.5)
MAGNESIUM SERPL-MCNC: 1.5 MG/DL (ref 1.6–2.4)
MCH RBC QN AUTO: 31.7 PG (ref 26–34)
MCHC RBC AUTO-ENTMCNC: 33.9 G/DL (ref 30–36.5)
MCV RBC AUTO: 93.7 FL (ref 80–99)
MONOCYTES # BLD: 0.6 K/UL (ref 0–1)
MONOCYTES NFR BLD AUTO: 9 % (ref 5–13)
NEUTS SEG # BLD: 4.4 K/UL (ref 1.8–8)
NEUTS SEG NFR BLD AUTO: 70 % (ref 32–75)
PLATELET # BLD AUTO: 235 K/UL (ref 150–400)
POTASSIUM SERPL-SCNC: 3.4 MMOL/L (ref 3.5–5.1)
RBC # BLD AUTO: 3.31 M/UL (ref 3.8–5.2)
SODIUM SERPL-SCNC: 140 MMOL/L (ref 136–145)
WBC # BLD AUTO: 6.2 K/UL (ref 3.6–11)

## 2017-05-16 PROCEDURE — 85025 COMPLETE CBC W/AUTO DIFF WBC: CPT | Performed by: PHYSICIAN ASSISTANT

## 2017-05-16 PROCEDURE — 97535 SELF CARE MNGMENT TRAINING: CPT | Performed by: OCCUPATIONAL THERAPIST

## 2017-05-16 PROCEDURE — 65270000029 HC RM PRIVATE

## 2017-05-16 PROCEDURE — 36415 COLL VENOUS BLD VENIPUNCTURE: CPT | Performed by: PHYSICIAN ASSISTANT

## 2017-05-16 PROCEDURE — 80048 BASIC METABOLIC PNL TOTAL CA: CPT | Performed by: PHYSICIAN ASSISTANT

## 2017-05-16 PROCEDURE — 74011250636 HC RX REV CODE- 250/636: Performed by: SURGERY

## 2017-05-16 PROCEDURE — 74011250637 HC RX REV CODE- 250/637: Performed by: SURGERY

## 2017-05-16 PROCEDURE — 97116 GAIT TRAINING THERAPY: CPT

## 2017-05-16 PROCEDURE — 83735 ASSAY OF MAGNESIUM: CPT | Performed by: PHYSICIAN ASSISTANT

## 2017-05-16 PROCEDURE — 74011250637 HC RX REV CODE- 250/637: Performed by: PHYSICIAN ASSISTANT

## 2017-05-16 PROCEDURE — 97165 OT EVAL LOW COMPLEX 30 MIN: CPT | Performed by: OCCUPATIONAL THERAPIST

## 2017-05-16 RX ORDER — POTASSIUM CHLORIDE 750 MG/1
40 TABLET, FILM COATED, EXTENDED RELEASE ORAL
Status: COMPLETED | OUTPATIENT
Start: 2017-05-16 | End: 2017-05-16

## 2017-05-16 RX ORDER — OXYCODONE HYDROCHLORIDE 5 MG/1
2.5-5 TABLET ORAL
Qty: 30 TAB | Refills: 0 | Status: SHIPPED | OUTPATIENT
Start: 2017-05-16

## 2017-05-16 RX ORDER — LIDOCAINE 50 MG/G
1 PATCH TOPICAL EVERY 24 HOURS
Status: DISCONTINUED | OUTPATIENT
Start: 2017-05-16 | End: 2017-05-25 | Stop reason: HOSPADM

## 2017-05-16 RX ORDER — IBUPROFEN 400 MG/1
400 TABLET ORAL EVERY 6 HOURS
Status: DISCONTINUED | OUTPATIENT
Start: 2017-05-18 | End: 2017-05-18

## 2017-05-16 RX ORDER — ACETAMINOPHEN 325 MG/1
650 TABLET ORAL
Qty: 60 TAB | Refills: 0 | Status: SHIPPED
Start: 2017-05-16

## 2017-05-16 RX ADMIN — ACETAMINOPHEN 650 MG: 325 TABLET ORAL at 20:39

## 2017-05-16 RX ADMIN — ACETAMINOPHEN 650 MG: 325 TABLET ORAL at 06:48

## 2017-05-16 RX ADMIN — GABAPENTIN 600 MG: 300 CAPSULE ORAL at 20:39

## 2017-05-16 RX ADMIN — LEVOTHYROXINE SODIUM 50 MCG: 0.05 TABLET ORAL at 06:48

## 2017-05-16 RX ADMIN — OXYCODONE HYDROCHLORIDE 5 MG: 5 TABLET ORAL at 22:47

## 2017-05-16 RX ADMIN — OXYCODONE HYDROCHLORIDE 5 MG: 5 TABLET ORAL at 18:09

## 2017-05-16 RX ADMIN — ACETAMINOPHEN 650 MG: 325 TABLET ORAL at 15:32

## 2017-05-16 RX ADMIN — PANTOPRAZOLE SODIUM 40 MG: 40 TABLET, DELAYED RELEASE ORAL at 06:48

## 2017-05-16 RX ADMIN — OXYCODONE HYDROCHLORIDE 5 MG: 5 TABLET ORAL at 13:26

## 2017-05-16 RX ADMIN — ACETAMINOPHEN 650 MG: 325 TABLET ORAL at 11:59

## 2017-05-16 RX ADMIN — GABAPENTIN 600 MG: 300 CAPSULE ORAL at 15:32

## 2017-05-16 RX ADMIN — OXYCODONE HYDROCHLORIDE 2.5 MG: 5 TABLET ORAL at 08:56

## 2017-05-16 RX ADMIN — AMLODIPINE BESYLATE 5 MG: 5 TABLET ORAL at 08:56

## 2017-05-16 RX ADMIN — HYDROCHLOROTHIAZIDE 25 MG: 25 TABLET ORAL at 08:55

## 2017-05-16 RX ADMIN — VENLAFAXINE HYDROCHLORIDE 150 MG: 150 CAPSULE, EXTENDED RELEASE ORAL at 08:56

## 2017-05-16 RX ADMIN — POTASSIUM CHLORIDE 40 MEQ: 750 TABLET, FILM COATED, EXTENDED RELEASE ORAL at 08:56

## 2017-05-16 RX ADMIN — KETOROLAC TROMETHAMINE 15 MG: 30 INJECTION, SOLUTION INTRAMUSCULAR at 06:47

## 2017-05-16 RX ADMIN — ENOXAPARIN SODIUM 40 MG: 40 INJECTION SUBCUTANEOUS at 04:26

## 2017-05-16 RX ADMIN — GABAPENTIN 600 MG: 300 CAPSULE ORAL at 08:56

## 2017-05-16 NOTE — PROGRESS NOTES
Problem: Mobility Impaired (Adult and Pediatric)  Goal: *Acute Goals and Plan of Care (Insert Text)  Physical Therapy Goals  Initiated 5/15/2017  1. Patient will move from supine to sit and sit to supine , scoot up and down and roll side to side in bed with independence within 7 day(s). 2. Patient will transfer from bed to chair and chair to bed with modified independence using the least restrictive device within 7 day(s). 3. Patient will perform sit to stand with modified independence within 7 day(s). 4. Patient will ambulate with modified independence for 300 feet with the least restrictive device within 7 day(s). 5. Patient will ascend/descend 5 stairs with one handrail(s) with minimal assistance/contact guard assist within 7 day(s). PHYSICAL THERAPY TREATMENT  Patient: Mary Jane Vital (89 y.o. female)  Date: 5/16/2017  Diagnosis: APPENDICITIS  Appendicitis <principal problem not specified>  Procedure(s) (LRB):  LAPAROSCOPIC TAKE DOWN OF SPLENIC FLEXURE  AND RIGHT COLECTOMY (N/A) 4 Days Post-Op  Precautions: Fall, Bed Alarm      ASSESSMENT:  Pt received in bed, reporting 7/10 pain, agreeable to participate with physical therapy. SBA for bed mobility, recommended use of log rolling technique to decreased abdominal strain/pain supind>sitting, stated she preferred \"her way\". Sit<>stand with SBA, demonstrated good static standing balance. Gait training without AD x 150' with CGA, demonstrated increased postural sway and guarded posture but no LOB. Pt requested to return to bed secondary to pain. Pt alert and appropriate during today's session. Progression toward goals:  [X]    Improving appropriately and progressing toward goals  [ ]    Improving slowly and progressing toward goals  [ ]    Not making progress toward goals and plan of care will be adjusted       PLAN:  Patient continues to benefit from skilled intervention to address the above impairments.   Continue treatment per established plan of care.  Discharge Recommendations:  None  Further Equipment Recommendations for Discharge:  none       SUBJECTIVE:   Patient stated Not really but i need to I know.       OBJECTIVE DATA SUMMARY:   Critical Behavior:  Neurologic State: Alert  Orientation Level: Oriented X4  Cognition: Appropriate for age attention/concentration, Follows commands, Impulsive  Safety/Judgement: Awareness of environment, Decreased awareness of need for safety, Decreased insight into deficits, Fall prevention  Functional Mobility Training:  Bed Mobility:     Supine to Sit: Stand-by asssistance  Sit to Supine: Stand-by asssistance  Scooting: Supervision        Transfers:  Sit to Stand: Contact guard assistance  Stand to Sit: Contact guard assistance                             Balance:  Sitting: Intact  Standing: Intact; With support  Standing - Static: Good  Standing - Dynamic : Good  Ambulation/Gait Training:  Distance (ft): 150 Feet (ft)  Assistive Device: Gait belt  Ambulation - Level of Assistance: Contact guard assistance        Gait Abnormalities: Trunk sway increased; Path deviations              Speed/Kena: Pace decreased (<100 feet/min)                                  Stairs:                       Neuro Re-Education:     Therapeutic Exercises:      Pain:  Pain Scale 1: Visual  Pain Intensity 1: 3  Pain Location 1: Abdomen  Pain Orientation 1: Anterior  Pain Description 1: Aching  Pain Intervention(s) 1: Medication (see MAR)  Activity Tolerance:   Good  Please refer to the flowsheet for vital signs taken during this treatment.   After treatment:   [ ]    Patient left in no apparent distress sitting up in chair  [X]    Patient left in no apparent distress in bed  [X]    Call bell left within reach  [X]    Nursing notified  [ ]    Caregiver present  [X]    Bed alarm activated      COMMUNICATION/COLLABORATION:   The patients plan of care was discussed with: Registered Nurse     Prudence Hernandez   Time Calculation: 15 mins

## 2017-05-16 NOTE — PROGRESS NOTES
Bedside and Verbal shift change report given to Brittani Madison RN (oncoming nurse) by Tawny Vegas RN (offgoing nurse). Report included the following information SBAR, Kardex, OR Summary, Intake/Output and MAR.

## 2017-05-16 NOTE — PROGRESS NOTES
General Surgery Daily Progress Note    Patient: Haydee Frank MRN: [id number]  SSN: [id number]    YOB: 1940  Age: 69 y.o. Sex: female      Admit Date: 5/12/2017    Subjective:   Reports moderate abdominal pain but she is reluctant to take pain medication. Per RN, patient confused this morning but re-oriented fairly well. She is tolerating clears, denies N/V. Reports BM and flatus. Current Facility-Administered Medications   Medication Dose Route Frequency    oxyCODONE IR (ROXICODONE) tablet 2.5 mg  2.5 mg Oral Q4H PRN    diazePAM (VALIUM) injection 2.5 mg  2.5 mg IntraVENous Q4H PRN    oxyCODONE IR (ROXICODONE) tablet 5 mg  5 mg Oral Q4H PRN    HYDROmorphone (PF) (DILAUDID) injection 0.5 mg  0.5 mg IntraVENous Q4H PRN    ondansetron (ZOFRAN) injection 4 mg  4 mg IntraVENous Q6H PRN    amLODIPine (NORVASC) tablet 5 mg  5 mg Oral DAILY    pantoprazole (PROTONIX) tablet 40 mg  40 mg Oral ACB    gabapentin (NEURONTIN) capsule 600 mg  600 mg Oral TID    hydroCHLOROthiazide (HYDRODIURIL) tablet 25 mg  25 mg Oral DAILY    levothyroxine (SYNTHROID) tablet 50 mcg  50 mcg Oral ACB    ketorolac (TORADOL) injection 15 mg  15 mg IntraVENous Q6H    0.9% sodium chloride infusion  75 mL/hr IntraVENous CONTINUOUS    acetaminophen (TYLENOL) tablet 650 mg  650 mg Oral TIDAC    enoxaparin (LOVENOX) injection 40 mg  40 mg SubCUTAneous Q24H    venlafaxine-SR (EFFEXOR-XR) capsule 150 mg  150 mg Oral DAILY WITH BREAKFAST        Objective:      05/14 1901 - 05/16 0700  In: -   Out: 176 [Drains:176]  Patient Vitals for the past 8 hrs:   BP Temp Pulse Resp SpO2   05/16/17 0801 149/71 98.2 °F (36.8 °C) 87 16 91 %   05/16/17 0342 138/71 98.1 °F (36.7 °C) 93 17 95 %       Physical Exam:  General: Alert, cooperative, NAD  Lungs: Unlabored  Heart:  Regular rate and rhythm  Abdomen: Soft, ATTP, mildly distended. + bowel sounds. Incisions c/d/i. VALERIA drain SS.    Extremities: Warm, moves all, no edema  Skin:  Warm and dry, no rash    Labs:   Recent Labs      05/16/17 0220   WBC  6.2   HGB  10.5*   HCT  31.0*   PLT  235     Recent Labs      05/16/17 0220   NA  140   K  3.4*   CL  104   CO2  29   GLU  101*   BUN  8   CREA  0.74   CA  8.0*   MG  1.5*       Assessment / Plan:   · POD#4 lap right colectomy  · Tolerating diet  · Pain controlled  · Mobilized well with therapy.  Does not need further rehab or therapy at discharge  · Replete K+  · Pathology pending  · Anticipate d/c in am

## 2017-05-16 NOTE — PROGRESS NOTES
Problem: Self Care Deficits Care Plan (Adult)  Goal: *Acute Goals and Plan of Care (Insert Text)  Occupational Therapy Goals  Initiated 5/16/2017  1. Patient will perform grooming standing at sink with modified independence within 7 day(s). 2. Patient will perform bathing with modified independence within 7 day(s). 3. Patient will perform lower body dressing with modified independence within 7 day(s). 4. Patient will perform toilet transfers with modified independence within 7 day(s). 5. Patient will perform all aspects of toileting with modified independence within 7 day(s). 6. Patient will participate in upper extremity therapeutic exercise/activities with independence for 10 minutes within 7 day(s). 7. Patient will utilize energy conservation techniques during functional activities with verbal and visual cues within 7 day(s). OCCUPATIONAL THERAPY EVALUATION  Patient: Dalphine Eisenmenger (39 y.o. female)  Date: 5/16/2017  Primary Diagnosis: APPENDICITIS  Appendicitis  Procedure(s) (LRB):  LAPAROSCOPIC TAKE DOWN OF SPLENIC FLEXURE  AND RIGHT COLECTOMY (N/A) 4 Days Post-Op   Precautions:  Fall, Bed Alarm      ASSESSMENT :  Based on the objective data described below, the patient presents with intermittent cognitive impairments, decreased safety awareness, abdominal pain 7/10 and decreased mobility following abdominal sx after admission for appendicitis. Pt lives with her  in [place] and is just visiting with family in [facility]. She currently requires up to min A for LE ADLs and SBA for toileting and functional mobility. She will be staying with her step-son while recovering. Do not anticipate any OT needs after discharge. Patient will benefit from skilled intervention to address the above impairments.   Patients rehabilitation potential is considered to be Guarded  Factors which may influence rehabilitation potential include:   [ ]             None noted  [X]             Mental ability/status  [ ] Medical condition  [ ]             Home/family situation and support systems  [ ]             Safety awareness  [X]             Pain tolerance/management  [ ]             Other:        PLAN :  Recommendations and Planned Interventions:  [X]               Self Care Training                  [X]        Therapeutic Activities  [X]               Functional Mobility Training    [X]        Cognitive Retraining  [X]               Therapeutic Exercises           [X]        Endurance Activities  [X]               Balance Training                   [ ]        Neuromuscular Re-Education  [ ]               Visual/Perceptual Training     [X]   Home Safety Training  [X]               Patient Education                 [X]        Family Training/Education  [ ]               Other (comment):     Frequency/Duration: Patient will be followed by occupational therapy 5 times a week to address goals. Discharge Recommendations: None for OT  Further Equipment Recommendations for Discharge: TBD       SUBJECTIVE:   Patient stated [facility] are you so nice?       OBJECTIVE DATA SUMMARY:   HISTORY:   Past Medical History:   Diagnosis Date    Hypertension      Thyroid disease       Past Surgical History:   Procedure Laterality Date    HX HYSTERECTOMY        HX ORTHOPAEDIC         mult sx on L ankle        Prior Level of Function/Home Situation: Per pt independent with ADLs and functional mobility.  Does not drive  Home Situation  Home Environment: Private residence (will go to son's house)  # Steps to Enter: 5  Rails to Enter: Yes  Hand Rails : Bilateral  Wheelchair Ramp: No  One/Two Story Residence: Two story, live on 1st floor  # of Interior Steps: 13  Interior Rails: Right  Lift Chair Available: No  Living Alone: No  Patient Expects to be Discharged to[de-identified] Private residence  Current DME Used/Available at Home: Cane, straight  Tub or Shower Type: Tub/Shower combination  [X]  Right hand dominant             [ ]  Left hand dominant EXAMINATION OF PERFORMANCE DEFICITS:  Cognitive/Behavioral Status:  Neurologic State: Alert  Orientation Level: Oriented X4  Cognition: Appropriate for age attention/concentration; Follows commands; Impulsive  Perception: Appears intact  Perseveration: No perseveration noted  Safety/Judgement: Awareness of environment;Decreased awareness of need for safety;Decreased insight into deficits; Fall prevention     Hearing: Auditory  Auditory Impairment: None     Vision/Perceptual:    Acuity: Within Defined Limits          Range of Motion:  AROM: Generally decreased, functional                          Strength:  Strength: Generally decreased, functional                 Coordination:     Fine Motor Skills-Upper: Left Intact; Right Intact    Gross Motor Skills-Upper: Left Intact; Right Intact     Tone & Sensation:  Tone: Normal  Sensation: Impaired (peripheral neuropathy B feet)                       Balance:  Sitting: Intact  Standing: Intact; With support     Functional Mobility and Transfers for ADLs:  Bed Mobility:  Supine to Sit: Supervision; Additional time (due to abdominal pain)  Scooting: Supervision     Transfers:  Sit to Stand: Stand-by asssistance  Toilet Transfer : Stand-by asssistance (to amb into bathroom for safety)     ADL Assessment:  Feeding: Independent     Oral Facial Hygiene/Grooming: Stand-by assistance; Additional time (standing at sink)     Bathing: Minimum assistance; Additional time;Assist x1 (A to reach feet due to decreased mobility from pain)     Upper Body Dressing: Setup     Lower Body Dressing: Minimum assistance; Additional time;Assist x1 (A to reach feet due to decreased mobility from pain)     Toileting: Stand by assistance; Additional time (for safety with standing)                 ADL Intervention and task modifications:  Patient was educated on the benefits of maintaining activity tolerance, functional mobility, and independence with self care tasks during acute stay.  Encouraged patient to be out of bed for all meals, perform daily ADLs (as approved by RN/MD regarding bathing etc), performing functional mobility to/from bathroom, and increasing time OOB daily with assist. Patient educated about the importance of maintaining activity tolerance to ensure safe return home and to baseline. Patient verbalized understanding of education. Cognitive Retraining  Safety/Judgement: Awareness of environment;Decreased awareness of need for safety;Decreased insight into deficits; Fall prevention        Functional Measure:  Barthel Index:      Bathin  Bladder: 5  Bowels: 5  Groomin  Dressin  Feeding: 10  Mobility: 10  Stairs: 5  Toilet Use: 5  Transfer (Bed to Chair and Back): 10  Total: 60         Barthel and G-code impairment scale:  Percentage of impairment CH  0% CI  1-19% CJ  20-39% CK  40-59% CL  60-79% CM  80-99% CN  100%   Barthel Score 0-100 100 99-80 79-60 59-40 20-39 1-19    0   Barthel Score 0-20 20 17-19 13-16 9-12 5-8 1-4 0      The Barthel ADL Index: Guidelines  1. The index should be used as a record of what a patient does, not as a record of what a patient could do. 2. The main aim is to establish degree of independence from any help, physical or verbal, however minor and for whatever reason. 3. The need for supervision renders the patient not independent. 4. A patient's performance should be established using the best available evidence. Asking the patient, friends/relatives and nurses are the usual sources, but direct observation and common sense are also important. However direct testing is not needed. 5. Usually the patient's performance over the preceding 24-48 hours is important, but occasionally longer periods will be relevant. 6. Middle categories imply that the patient supplies over 50 per cent of the effort. 7. Use of aids to be independent is allowed. Irene Spencer., Barthel, D.W. (1282). Functional evaluation: the Barthel Index. [facility] (14)2.   Geoffrey Carrillo charles KAREN Guerrero, Elissa Ortega., Eliezer Persaud., Daly Tripp. (1999). Measuring the change indisability after inpatient rehabilitation; comparison of the responsiveness of the Barthel Index and Functional [place] Measure. Journal of Neurology, Neurosurgery, and Psychiatry, 66(4), 641-154. NEELIMA Mixon, ANITA Hidalgo, & Reina Rhodes M.A. (2004.) Assessment of post-stroke quality of life in cost-effectiveness studies: The usefulness of the Barthel Index and the EuroQoL-5D. Quality of Life Research, 13, 773-84         G codes: In compliance with CMSs Claims Based Outcome Reporting, the following G-code set was chosen for this patient based on their primary functional limitation being treated: The outcome measure chosen to determine the severity of the functional limitation was the Barthel Index with a score of 60/100 which was correlated with the impairment scale. · Self Care:               - CURRENT STATUS:    CK - 40%-59% impaired, limited or restricted               - GOAL STATUS:           CJ - 20%-39% impaired, limited or restricted               - D/C STATUS:                       ---------------To be determined---------------      Occupational Therapy Evaluation Charge Determination   History Examination Decision-Making   LOW Complexity : Brief history review  MEDIUM Complexity : 3-5 performance deficits relating to physical, cognitive , or psychosocial skils that result in activity limitations and / or participation restrictions MEDIUM Complexity : Patient may present with comorbidities that affect occupational performnce.  Miniml to moderate modification of tasks or assistance (eg, physical or verbal ) with assesment(s) is necessary to enable patient to complete evaluation       Based on the above components, the patient evaluation is determined to be of the following complexity level: LOW   Pain:  Pain Scale 1: Visual  Pain Intensity 1: 3  Pain Location 1: Abdomen  Pain Orientation 1: Anterior  Pain Description 1: Aching  Pain Intervention(s) 1: Medication (see MAR)  Activity Tolerance:   Fair  Please refer to the flowsheet for vital signs taken during this treatment. After treatment:   [X] Patient left in no apparent distress sitting up in chair  [ ] Patient left in no apparent distress in bed  [X] Call bell left within reach  [X] Nursing notified  [ ] Caregiver present  [X] Bed alarm activated      COMMUNICATION/EDUCATION:   The patients plan of care was discussed with: Registered Nurse.  [X] Home safety education was provided and the patient/caregiver indicated understanding. [X] Patient/family have participated as able in goal setting and plan of care. [X] Patient/family agree to work toward stated goals and plan of care. [ ] Patient understands intent and goals of therapy, but is neutral about his/her participation. [ ] Patient is unable to participate in goal setting and plan of care. This patients plan of care is appropriate for delegation to [facility].      Thank you for this referral.  Ada Pierre OT  Time Calculation: 24 mins

## 2017-05-17 ENCOUNTER — APPOINTMENT (OUTPATIENT)
Dept: CT IMAGING | Age: 77
DRG: 330 | End: 2017-05-17
Attending: SURGERY
Payer: MEDICARE

## 2017-05-17 ENCOUNTER — HOME HEALTH ADMISSION (OUTPATIENT)
Dept: HOME HEALTH SERVICES | Facility: HOME HEALTH | Age: 77
End: 2017-05-17

## 2017-05-17 PROCEDURE — 65270000029 HC RM PRIVATE

## 2017-05-17 PROCEDURE — 74177 CT ABD & PELVIS W/CONTRAST: CPT

## 2017-05-17 PROCEDURE — 74011636320 HC RX REV CODE- 636/320: Performed by: RADIOLOGY

## 2017-05-17 PROCEDURE — 97116 GAIT TRAINING THERAPY: CPT

## 2017-05-17 PROCEDURE — 74011250637 HC RX REV CODE- 250/637: Performed by: SURGERY

## 2017-05-17 PROCEDURE — 97535 SELF CARE MNGMENT TRAINING: CPT

## 2017-05-17 PROCEDURE — 74011250637 HC RX REV CODE- 250/637: Performed by: PHYSICIAN ASSISTANT

## 2017-05-17 PROCEDURE — 74011636320 HC RX REV CODE- 636/320: Performed by: SURGERY

## 2017-05-17 PROCEDURE — 74011250636 HC RX REV CODE- 250/636: Performed by: SURGERY

## 2017-05-17 RX ADMIN — ACETAMINOPHEN 650 MG: 325 TABLET ORAL at 16:13

## 2017-05-17 RX ADMIN — HYDROCHLOROTHIAZIDE 25 MG: 25 TABLET ORAL at 08:27

## 2017-05-17 RX ADMIN — LEVOTHYROXINE SODIUM 50 MCG: 0.05 TABLET ORAL at 06:33

## 2017-05-17 RX ADMIN — OXYCODONE HYDROCHLORIDE 5 MG: 5 TABLET ORAL at 10:06

## 2017-05-17 RX ADMIN — VENLAFAXINE HYDROCHLORIDE 150 MG: 150 CAPSULE, EXTENDED RELEASE ORAL at 08:27

## 2017-05-17 RX ADMIN — OXYCODONE HYDROCHLORIDE 5 MG: 5 TABLET ORAL at 21:14

## 2017-05-17 RX ADMIN — PANTOPRAZOLE SODIUM 40 MG: 40 TABLET, DELAYED RELEASE ORAL at 06:33

## 2017-05-17 RX ADMIN — AMLODIPINE BESYLATE 5 MG: 5 TABLET ORAL at 08:27

## 2017-05-17 RX ADMIN — GABAPENTIN 600 MG: 300 CAPSULE ORAL at 16:12

## 2017-05-17 RX ADMIN — IOPAMIDOL 94 ML: 755 INJECTION, SOLUTION INTRAVENOUS at 19:05

## 2017-05-17 RX ADMIN — GABAPENTIN 600 MG: 300 CAPSULE ORAL at 08:27

## 2017-05-17 RX ADMIN — ACETAMINOPHEN 650 MG: 325 TABLET ORAL at 10:51

## 2017-05-17 RX ADMIN — GABAPENTIN 600 MG: 300 CAPSULE ORAL at 21:14

## 2017-05-17 RX ADMIN — ACETAMINOPHEN 650 MG: 325 TABLET ORAL at 06:34

## 2017-05-17 RX ADMIN — DIATRIZOATE MEGLUMINE AND DIATRIZOATE SODIUM 30 ML: 660; 100 LIQUID ORAL; RECTAL at 17:18

## 2017-05-17 RX ADMIN — OXYCODONE HYDROCHLORIDE 5 MG: 5 TABLET ORAL at 05:00

## 2017-05-17 RX ADMIN — OXYCODONE HYDROCHLORIDE 5 MG: 5 TABLET ORAL at 14:41

## 2017-05-17 RX ADMIN — ENOXAPARIN SODIUM 40 MG: 40 INJECTION SUBCUTANEOUS at 05:01

## 2017-05-17 NOTE — PROGRESS NOTES
Order for home health noted. Met with pt and her . They chose [address] CM sent referral.  Thanks.   Anna Smith LCSW

## 2017-05-17 NOTE — PROGRESS NOTES
Spiritual Care Assessment/Progress Notes    Fly Rubin [id number]  [id number]    1940  69 y.o.  female    Patient Telephone Number: [phone] (home)   Yarsani Affiliation: [place]   Language: English   Extended Emergency Contact Information  Primary Emergency Contact: Cherie Mercado  Mobile Phone: [phone]  Relation: Spouse  Secondary Emergency Contact: [address]  Mobile Phone: [phone]  Relation: Child   There are no active problems to display for this patient. Date: 5/17/2017       Level of Yarsani/Spiritual Activity:  [x]         Involved in josette tradition/spiritual practice    []         Not involved in josette tradition/spiritual practice  [x]         Spiritually oriented    []         Claims no spiritual orientation    []         seeking spiritual identity  []         Feels alienated from Buddhism practice/tradition  []         Feels angry about Buddhism practice/tradition  [x]         Spirituality/Buddhism tradition is a resource for coping at this time.   []         Not able to assess due to medical condition    Services Provided Today:  []         crisis intervention    []         reading Scriptures  [x]         spiritual assessment    [x]         prayer  [x]         empathic listening/emotional support  []         rites and rituals (cite in comments)  [x]         life review     []         Buddhism support  []         theological development   []         advocacy  []         ethical dialog     [x]         blessing  []         bereavement support    []         support to family  []         anticipatory grief support   []         help with AMD  []         spiritual guidance    []         meditation      Spiritual Care Needs  [x]         Emotional Support  [x]         Spiritual/Yarsani Care  []         Loss/Adjustment  []         Advocacy/Referral                /Ethics  []         No needs expressed at               this time  []         Other: (note in               comments)  Spiritual Care Plan  []         Follow up visits with               pt/family  []         Provide materials  []         Schedule sacraments  []         Contact Community               Clergy  [x]         Follow up as needed  []         Other: (note in               comments)     Comments: Aroldo Wilkes is visiting for an initial spiritual assessment with pt and spouse in room 422. Pt states she is feeling a little better. She notes it has been a challenge to be ill, as this was unexpected and took place while she and her  were [address]. Pt and  are Lebron Mcelroy and attend a [employer] in [place].  grew up [place]. Pt identifies as Quaker. They note they feel like God is working through their circumstances allowing them the ability to be with their son, who is currently ill with his own advanced disease. Pt reflected on God and God's role and notes she has a deep josette. However, she notes she lost her daughter to an untimely illness and this has been tragic for her. Still, she notes she is faithful in prayer, trusts God's guidance, and keeps josette even if she does not have all the answers why she lost her daughter.  offered spiritual support through active listening, affirmation, conversation and prayer.      [address] Kady Carmona M.Div, M.S, Paige Barth2 available at [address] [place] [phone])

## 2017-05-17 NOTE — PROGRESS NOTES
Problem: Self Care Deficits Care Plan (Adult)  Goal: *Acute Goals and Plan of Care (Insert Text)  Occupational Therapy Goals  Initiated 5/16/2017  1. Patient will perform grooming standing at sink with modified independence within 7 day(s). 2. Patient will perform bathing with modified independence within 7 day(s). 3. Patient will perform lower body dressing with modified independence within 7 day(s). 4. Patient will perform toilet transfers with modified independence within 7 day(s). 5. Patient will perform all aspects of toileting with modified independence within 7 day(s). 6. Patient will participate in upper extremity therapeutic exercise/activities with independence for 10 minutes within 7 day(s). 7. Patient will utilize energy conservation techniques during functional activities with verbal and visual cues within 7 day(s). OCCUPATIONAL THERAPY TREATMENT  Patient: Leticia Coto (84 y.o. female)  Date: 5/17/2017  Diagnosis: APPENDICITIS  Appendicitis <principal problem not specified>  Procedure(s) (LRB):  LAPAROSCOPIC TAKE DOWN OF SPLENIC FLEXURE  AND RIGHT COLECTOMY (N/A) 5 Days Post-Op  Precautions: Fall, Bed Alarm      ASSESSMENT:  Patient received up in chair,c/o continued abdominal soreness with all mobility. Patient demonstrates good safety awareness, reviewed compensatory techniques and safety strategies. Patient to return to [facility] home, living space on basement level with no bathroom available. Recommend BSC, family given information on how/where to obtain. Patient would benefit from [facility] followup for increased safety with ADLS and functional task mobility, is resistant to use of assistive device for mobility and requires SBA for transfers.   Progression toward goals:  [X]       Improving appropriately and progressing toward goals  [ ]       Improving slowly and progressing toward goals  [ ]       Not making progress toward goals and plan of care will be adjusted       PLAN:  Patient continues to benefit from skilled intervention to address the above impairments. Continue treatment per established plan of care. Discharge Recommendations:  Home Health  Further Equipment Recommendations for Discharge:  Family to obtain [facility]       SUBJECTIVE:   Patient stated I was hoping I might get out today      OBJECTIVE DATA SUMMARY:   Cognitive/Behavioral Status:  Neurologic State: Alert  Orientation Level: Oriented X4  Cognition: Follows commands  Perception: Appears intact  Perseveration: No perseveration noted  Safety/Judgement: Awareness of environment     Functional Mobility and Transfers for ADLs:  Bed Mobility:  Rolling:  (received in seated position)  Scooting: Supervision     Transfers:  Sit to Stand: Contact guard assistance  Functional Transfers  Bathroom Mobility: Minimum assistance  Toilet Transfer : Minimum assistance     Balance:  Sitting: Intact  Standing: Intact  Standing - Static: Good  Standing - Dynamic : Good     ADL Intervention:  Feeding  Feeding Assistance: Supervision/set-up     Grooming  Grooming Assistance: Supervision/set up (in seated position)  Washing Face: Supervision/set-up  Washing Hands: Supervision/set-up  Brushing Teeth: Supervision/set-up     Upper Body Bathing  Bathing Assistance: Minimum assistance  Position Performed: Seated in chair     Lower Body Bathing  Bathing Assistance: Minimum assistance (limited forward trunk flexion with abdominal pain)  Perineal  : Stand-by assistance  Lower Body : Minimum assistance     Upper Body Dressing Assistance  Dressing Assistance: [address] Gown: Supervision/ set-up  Shirt simulation with hospital gown: Supervision/set-up     Lower Body Dressing Assistance  Dressing Assistance: Moderate assistance  Underpants: Moderate assistance  Socks:  Total assistance (dependent)  Position Performed: Bending forward method;Seated in chair  Cues: Don;Physical assistance  Adaptive Equipment Used: Reacher;Sock aid (resistive to AE) Toileting  Toileting Assistance: Minimum assistance  Bladder Hygiene: Minimum assistance  Bowel Hygiene: Minimum assistance  Clothing Management: Minimum assistance  Adaptive Equipment: Grab bars     Cognitive Retraining  Safety/Judgement: Awareness of environment     Pain:  Pain Scale 1: Numeric (0 - 10)  Pain Intensity 1: 0  Pain Location 1: Abdomen  Pain Orientation 1: Anterior  Pain Description 1: Aching  Pain Intervention(s) 1: Medication (see MAR)  Activity Tolerance:   Patient completed multiple sit to stand transfers, mobility around room and bathroom with SBA  in completion of ADLS   Please refer to the flowsheet for vital signs taken during this treatment.   After treatment:   [X] Patient left in no apparent distress sitting up in chair  [ ] Patient left in no apparent distress in bed  [X] Call bell left within reach  [X] Nursing notified  [X] Caregiver present  [ ] Bed alarm activated      COMMUNICATION/COLLABORATION:   The patients plan of care was discussed with: Physical Therapy Assistant, Registered Nurse and      Brittani Guy OT  Time Calculation: 30 mins

## 2017-05-17 NOTE — PROGRESS NOTES
General Surgery Daily Progress Note    Patient: Kelby Mccain MRN: [id number]  SSN: [id number]    YOB: 1940  Age: 69 y.o. Sex: female      Admit Date: 5/12/2017    Subjective:   Pain controlled, tolerating diet. Bowel function continues. Current Facility-Administered Medications   Medication Dose Route Frequency    [START ON 5/18/2017] ibuprofen (MOTRIN) tablet 400 mg  400 mg Oral Q6H    lidocaine (LIDODERM) 5 % patch 1 Patch  1 Patch TransDERmal Q24H    oxyCODONE IR (ROXICODONE) tablet 2.5 mg  2.5 mg Oral Q4H PRN    diazePAM (VALIUM) injection 2.5 mg  2.5 mg IntraVENous Q4H PRN    oxyCODONE IR (ROXICODONE) tablet 5 mg  5 mg Oral Q4H PRN    HYDROmorphone (PF) (DILAUDID) injection 0.5 mg  0.5 mg IntraVENous Q4H PRN    ondansetron (ZOFRAN) injection 4 mg  4 mg IntraVENous Q6H PRN    amLODIPine (NORVASC) tablet 5 mg  5 mg Oral DAILY    pantoprazole (PROTONIX) tablet 40 mg  40 mg Oral ACB    gabapentin (NEURONTIN) capsule 600 mg  600 mg Oral TID    hydroCHLOROthiazide (HYDRODIURIL) tablet 25 mg  25 mg Oral DAILY    levothyroxine (SYNTHROID) tablet 50 mcg  50 mcg Oral ACB    acetaminophen (TYLENOL) tablet 650 mg  650 mg Oral TIDAC    enoxaparin (LOVENOX) injection 40 mg  40 mg SubCUTAneous Q24H    venlafaxine-SR (EFFEXOR-XR) capsule 150 mg  150 mg Oral DAILY WITH BREAKFAST        Objective:      05/15 1901 - 05/17 0700  In: -   Out: 256 [Drains:256]  Patient Vitals for the past 8 hrs:   BP Temp Pulse Resp SpO2   05/17/17 0741 140/63 98.2 °F (36.8 °C) 79 18 92 %       Physical Exam:  General: Alert, cooperative, NAD  Lungs: Clear to auscultation bilaterally  Heart:  Regular rate and rhythm  Abdomen: Soft, ATTP, non-distended. + bowel sounds. Incisions c/d/o. VALERIA drain SS.    Extremities: Warm, moves all, no edema  Skin:  Warm and dry, no rash    Labs: Recent Labs      05/16/17 0220   WBC  6.2   HGB  10.5*   HCT  31.0*   PLT  235     Recent Labs      05/16/17 0220   NA  140   K  3.4*   CL 104   CO2  29   GLU  101*   BUN  8   CREA  0.74   CA  8.0*   MG  1.5*       Assessment / Plan:   · POD#5 lap right colectomy  · Continue wound packing of LLQ wound  · Keep drain at discharge  · D/c today. Patient will remain in the [place] area at least until recheck next week. We will remove drain and review pathology at that time.

## 2017-05-17 NOTE — DISCHARGE SUMMARY
Surgery Discharge Summary     Patient ID:  Caitlin Tenorio  [id number]  72 y.o.  1940    Admit date: 5/12/2017    Discharge date and time: No discharge date for patient encounter. Admission Diagnoses: There is no problem list on file for this patient. Discharge Diagnoses: No discharge information exists for this patient. There is no problem list on file for this patient. Procedures for this admission: Procedure(s):  [address] Course: Ms. Laura Hernandez presented to the ED on DOA with c/o abdominal pain and was found to have CT evidence of appendiceal thickening concerning for appendicitis vs malignancy. She was taken to the OR, was found to have a thickened cecum and underwent lap right colectomy. She had an uneventful post-op operative course. She was discharged on POD7 with plan for [facility] wound care and follow up in the office next week for recheck and drain removal. Patient is from [facility] but will remain in [facility] until she has had post-op follow and pathology has been reviewed. Disposition: Home or self care    Discharged Condition : stable    Instructions: Follow-up in office  in 1 week.               Signed:  YOSEF Amaro  5/17/2017  2:43 PM

## 2017-05-17 NOTE — PROGRESS NOTES
Patient abdomen seems to be a little more distended than this morning. Notified Amairani NAVAS of these findings. Patient is scheduled to be discharged, with post op appointment with Dr. Anand Cote in 1 week. Dr. Anand Cote came to see patient before discharge. Dr. Anand Cote ordered a CT of the abdomen and patient will drink contrast dye before going down to CT. Bladimir Moeller RN was given this information during report handoff. I pulled the contrast dye from the pyxis, it will be administered by [facility].

## 2017-05-17 NOTE — PROGRESS NOTES
Problem: Mobility Impaired (Adult and Pediatric)  Goal: *Acute Goals and Plan of Care (Insert Text)  Physical Therapy Goals  Initiated 5/15/2017  1. Patient will move from supine to sit and sit to supine , scoot up and down and roll side to side in bed with independence within 7 day(s). 2. Patient will transfer from bed to chair and chair to bed with modified independence using the least restrictive device within 7 day(s). 3. Patient will perform sit to stand with modified independence within 7 day(s). 4. Patient will ambulate with modified independence for 300 feet with the least restrictive device within 7 day(s). 5. Patient will ascend/descend 5 stairs with one handrail(s) with minimal assistance/contact guard assist within 7 day(s). PHYSICAL THERAPY TREATMENT  Patient: Mary Torres (14 y.o. female)  Date: 5/17/2017  Diagnosis: APPENDICITIS  Appendicitis <principal problem not specified>  Procedure(s) (LRB):  LAPAROSCOPIC TAKE DOWN OF SPLENIC FLEXURE  AND RIGHT COLECTOMY (N/A) 5 Days Post-Op  Precautions: Fall, Bed Alarm      ASSESSMENT:  Pt received in chair, agreeable to participate with physical therapy. Functional transfers with SBA. Pt reports increased abdominal pain with sit<>stand transfers. Ascend /descend 4 steps usign B handrails with CGA. verbla cues for one step at a time for safe technique ,pt adapted well. Gait training without ADx 100' with SBA. Demonstrates slow,guarded gait but no LOB.  present and supportive throughout session. Pt is cleared for discahrge from hospital from PT perspective. Progression toward goals:  [X]    Improving appropriately and progressing toward goals  [ ]    Improving slowly and progressing toward goals  [ ]    Not making progress toward goals and plan of care will be adjusted       PLAN:  Patient continues to benefit from skilled intervention to address the above impairments. Continue treatment per established plan of care.   Discharge Recommendations: None  Further Equipment Recommendations for Discharge:  none       SUBJECTIVE:   Patient stated I am ready to go.       OBJECTIVE DATA SUMMARY:   Critical Behavior:  Neurologic State: Alert  Orientation Level: Oriented X4  Cognition: Follows commands  Safety/Judgement: Awareness of environment  Functional Mobility Training:  Bed Mobility:  Rolling:  (received in seated position)        Scooting: Supervision        Transfers:  Sit to Stand: Stand-by asssistance  Stand to Sit: Stand-by asssistance        Bed to Chair: Contact guard assistance                    Balance:  Sitting: Intact  Standing: Intact  Standing - Static: Good  Standing - Dynamic : Good  Ambulation/Gait Training:  Distance (ft): 100 Feet (ft)  Assistive Device: Gait belt  Ambulation - Level of Assistance: Stand-by asssistance        Gait Abnormalities: Altered arm swing              Speed/Kena: Slow                                  Stairs:  Number of Stairs Trained: 4  Stairs - Level of Assistance: Stand-by asssistance              Rail Use: Both  Neuro Re-Education:     Therapeutic Exercises:      Pain:  Pain Scale 1: Numeric (0 - 10)  Pain Intensity 1: 0  Pain Location 1: Abdomen  Pain Orientation 1: Anterior  Pain Description 1: Aching  Pain Intervention(s) 1: Medication (see MAR)  Activity Tolerance:   Good  Please refer to the flowsheet for vital signs taken during this treatment.   After treatment:   [X]    Patient left in no apparent distress sitting up in chair  [ ]    Patient left in no apparent distress in bed  [X]    Call bell left within reach  [X]    Nursing notified  [X]    Caregiver present  [X]    Chair alarm activated      COMMUNICATION/COLLABORATION:   The patients plan of care was discussed with: Registered Nurse     Aishwarya Bacon   Time Calculation: 15 mins

## 2017-05-18 PROCEDURE — 74011000258 HC RX REV CODE- 258: Performed by: PHYSICIAN ASSISTANT

## 2017-05-18 PROCEDURE — 65270000029 HC RM PRIVATE

## 2017-05-18 PROCEDURE — 74011250636 HC RX REV CODE- 250/636: Performed by: PHYSICIAN ASSISTANT

## 2017-05-18 PROCEDURE — 74011250637 HC RX REV CODE- 250/637: Performed by: PHYSICIAN ASSISTANT

## 2017-05-18 PROCEDURE — 97116 GAIT TRAINING THERAPY: CPT

## 2017-05-18 PROCEDURE — 74011250636 HC RX REV CODE- 250/636: Performed by: SURGERY

## 2017-05-18 PROCEDURE — 74011250637 HC RX REV CODE- 250/637: Performed by: SURGERY

## 2017-05-18 RX ORDER — KETOROLAC TROMETHAMINE 30 MG/ML
15 INJECTION, SOLUTION INTRAMUSCULAR; INTRAVENOUS
Status: DISPENSED | OUTPATIENT
Start: 2017-05-18 | End: 2017-05-23

## 2017-05-18 RX ORDER — ENOXAPARIN SODIUM 100 MG/ML
40 INJECTION SUBCUTANEOUS EVERY 24 HOURS
Status: DISCONTINUED | OUTPATIENT
Start: 2017-05-18 | End: 2017-05-25 | Stop reason: HOSPADM

## 2017-05-18 RX ORDER — DEXTROSE, SODIUM CHLORIDE, AND POTASSIUM CHLORIDE 5; .45; .15 G/100ML; G/100ML; G/100ML
75 INJECTION INTRAVENOUS CONTINUOUS
Status: DISCONTINUED | OUTPATIENT
Start: 2017-05-18 | End: 2017-05-23

## 2017-05-18 RX ADMIN — VENLAFAXINE HYDROCHLORIDE 150 MG: 150 CAPSULE, EXTENDED RELEASE ORAL at 08:33

## 2017-05-18 RX ADMIN — ACETAMINOPHEN 650 MG: 325 TABLET ORAL at 11:33

## 2017-05-18 RX ADMIN — GABAPENTIN 600 MG: 300 CAPSULE ORAL at 08:33

## 2017-05-18 RX ADMIN — PIPERACILLIN SODIUM,TAZOBACTAM SODIUM 3.38 G: 3; .375 INJECTION, POWDER, FOR SOLUTION INTRAVENOUS at 10:23

## 2017-05-18 RX ADMIN — OXYCODONE HYDROCHLORIDE 5 MG: 5 TABLET ORAL at 20:40

## 2017-05-18 RX ADMIN — KETOROLAC TROMETHAMINE 15 MG: 30 INJECTION, SOLUTION INTRAMUSCULAR at 11:34

## 2017-05-18 RX ADMIN — HYDROCHLOROTHIAZIDE 25 MG: 25 TABLET ORAL at 08:33

## 2017-05-18 RX ADMIN — DEXTROSE MONOHYDRATE, SODIUM CHLORIDE, AND POTASSIUM CHLORIDE 75 ML/HR: 50; 4.5; 1.49 INJECTION, SOLUTION INTRAVENOUS at 09:00

## 2017-05-18 RX ADMIN — OXYCODONE HYDROCHLORIDE 5 MG: 5 TABLET ORAL at 10:23

## 2017-05-18 RX ADMIN — PIPERACILLIN SODIUM,TAZOBACTAM SODIUM 3.38 G: 3; .375 INJECTION, POWDER, FOR SOLUTION INTRAVENOUS at 17:28

## 2017-05-18 RX ADMIN — ACETAMINOPHEN 650 MG: 325 TABLET ORAL at 15:42

## 2017-05-18 RX ADMIN — AMLODIPINE BESYLATE 5 MG: 5 TABLET ORAL at 08:33

## 2017-05-18 RX ADMIN — ENOXAPARIN SODIUM 40 MG: 40 INJECTION SUBCUTANEOUS at 10:24

## 2017-05-18 RX ADMIN — HYDROMORPHONE HYDROCHLORIDE 0.5 MG: 1 INJECTION, SOLUTION INTRAMUSCULAR; INTRAVENOUS; SUBCUTANEOUS at 15:42

## 2017-05-18 RX ADMIN — GABAPENTIN 600 MG: 300 CAPSULE ORAL at 15:41

## 2017-05-18 NOTE — PROGRESS NOTES
Bedside and Verbal shift change report given to Renetta Mayo RN (oncoming nurse) by Krystina Maurer RN (offgoing nurse). Report included the following information SBAR, Kardex, OR Summary, Procedure Summary, Intake/Output and MAR.

## 2017-05-18 NOTE — PROGRESS NOTES
Occupational Therapy Note: Pt declined treatment stating 6/10 abdominal pain and RN administering medications. Will attempt OT this afternoon.

## 2017-05-18 NOTE — PROGRESS NOTES
Nutrition Assessment:    RECOMMENDATIONS/INTERVENTION(S):   Diet advancement per Surgery  Monitor diet advancement, PO, wt & need for starting appropriate ONS    ASSESSMENT:   5/18:  Pt seen for LOS. 69 yof admitted for appendicitis. POD #6 lap right colectomy. Diet was advanced to Regular, low fiber x 3 days post-op w/ fair PO. Spoke w/ RN, abd distention started yesterday. S/p CT showing concern for anastomotic leak - pt made NPO last night. Close observation, ABX. D5 w/ KCl @ 75 infusing. MD ordered Ensure Clear TID - sips. Called to have Ensure Clear delivered to pt room. Visited pt. Pt denies wt loss or decreased PO PTA. Unable to state UBW but did say \"I love to eat - I want a filet mignon. \"  MD notes pt reports of abd pain x 7 days PTA. Overwt per BMI. No wt hx. No signs of muscle wasting or SQ fat loss per NFPA conducted. No edema noted. Labs/meds reviewed. Lytes requiring repletion. SUBJECTIVE/OBJECTIVE:     Diet Order:  (NPO w/ sips of clears, Ensure Clear TID)  % Eaten:  No data found. Pertinent Medications: [x] Reviewed - protonix, zofran    Chemistries:  Lab Results   Component Value Date/Time    Sodium 140 05/16/2017 02:20 AM    Potassium 3.4 05/16/2017 02:20 AM    Chloride 104 05/16/2017 02:20 AM    CO2 29 05/16/2017 02:20 AM    Anion gap 7 05/16/2017 02:20 AM    Glucose 101 05/16/2017 02:20 AM    BUN 8 05/16/2017 02:20 AM    Creatinine 0.74 05/16/2017 02:20 AM    BUN/Creatinine ratio 11 05/16/2017 02:20 AM    GFR est AA >60 05/16/2017 02:20 AM    GFR est non-AA >60 05/16/2017 02:20 AM    Calcium 8.0 05/16/2017 02:20 AM    AST (SGOT) 29 05/12/2017 03:13 PM    Alk.  phosphatase 43 05/12/2017 03:13 PM    Protein, total 8.3 05/12/2017 03:13 PM    Albumin 3.5 05/12/2017 03:13 PM    Globulin 4.8 05/12/2017 03:13 PM    A-G Ratio 0.7 05/12/2017 03:13 PM    ALT (SGPT) 30 05/12/2017 03:13 PM      Anthropometrics: Height: 5' (152.4 cm) Weight: 68 kg (149 lb 14.6 oz)    IBW (%IBW): 45.4 kg (100 lb) (149.91 %) UBW (%UBW):   (  %)    BMI: Body mass index is 29.28 kg/(m^2). This BMI is indicative of:   [] Underweight    [] Normal    [x] Overweight    []  Obesity    []  Extreme Obesity (BMI>40)  Estimated Nutrition Needs (Based on): 1418 Kcals/day (RMR (1091) x 1.3 AF) , 74 g (-88 (1.1-1.3 g/kg x actual body wt)) Protein  Carbohydrate:  At Least 130 g/day  Fluids: 1400 mL/day    Last BM: 5/17, abd tender, distended   [x]Active     []Hyperactive  []Hypoactive       [] Absent   BS  Skin:    [] Intact   [x] Incision  [] Breakdown   [] DTI   [] Tears/Excoriation/Abrasion  []Edema [x] Other:VALERIA drain     Wt Readings from Last 30 Encounters:   05/18/17 68 kg (149 lb 14.6 oz)      NUTRITION DIAGNOSES:   Problem:  Inadequate oral food/beverage intake     Etiology: related to altered GI function      Signs/Symptoms: as evidenced by NPO & s/p lap right colectomy, concern for anastomotic leak      NUTRITION INTERVENTIONS:                General, healthful diet; commercial supplement - once PO initiated     GOAL:   Initiate PO diet w/ no s/s of intolerance in the next 1-3 days    Cultural, Adventism, or Ethnic Dietary Needs: None     LEARNING NEEDS (Diet, Food/Nutrient-Drug Interaction):    [x] None Identified   [] Identified and Education Provided/Documented   [] Identified and Pt declined/was not appropriate      [] Interdisciplinary Care Plan Reviewed/Documented    [] Discharge Needs:    TBD   [] No Nutrition Related Discharge Needs    NUTRITION RISK:   Pt Is At Nutrition Risk  [x]     No Nutrition Risk Identified  []       PT SEEN FOR:    []  MD Consult: []Calorie Count      []Diabetic Diet Education        []Diet Education     []Electrolyte Management     []General Nutrition Management and Supplements     []Management of Tube Feeding     []TPN Recommendations    []  RN Referral:  []MST score >=2     []Enteral/Parenteral Nutrition PTA     []Pregnant: Gestational DM or Multigestation                 [] Pressure Ulcer    []  Low BMI      [x]  Length of Stay       [] Dysphagia Diet         [] Ventilator  []  Follow-up     Previous Recommendations:   [] Implemented          [] Not Implemented          [x] Not Applicable    Previous Goal:   [] Met              [] Progressing Towards Goal              [] Not Progressing Towards Goal   [x] Not Applicable            June Cool, [address] [place] [address]   Pager [phone]

## 2017-05-18 NOTE — PROGRESS NOTES
Problem: Mobility Impaired (Adult and Pediatric)  Goal: *Acute Goals and Plan of Care (Insert Text)  Physical Therapy Goals  Initiated 5/15/2017  1. Patient will move from supine to sit and sit to supine , scoot up and down and roll side to side in bed with independence within 7 day(s). 2. Patient will transfer from bed to chair and chair to bed with modified independence using the least restrictive device within 7 day(s). 3. Patient will perform sit to stand with modified independence within 7 day(s). 4. Patient will ambulate with modified independence for 300 feet with the least restrictive device within 7 day(s). 5. Patient will ascend/descend 5 stairs with one handrail(s) with minimal assistance/contact guard assist within 7 day(s). PHYSICAL THERAPY TREATMENT  Patient: Matilde Sanchez (64 y.o. female)  Date: 5/18/2017  Diagnosis: APPENDICITIS  Appendicitis <principal problem not specified>  Procedure(s) (LRB):  LAPAROSCOPIC TAKE DOWN OF SPLENIC FLEXURE  AND RIGHT COLECTOMY (N/A) 6 Days Post-Op  Precautions: Fall, Bed Alarm      ASSESSMENT:  Pt received sitting EOB with nursing, readying to walk to bathroom. min A for sit>stand CGA for stand>sit transfers. Reports 6/10 pain but agreeable to proceed with gait training. Gait training with slow, guarded pace with CGA x75'. Pt requested to return to bed, secondary to fatigue and pain. Min A for sit>supine using pillow for abdominal brace for transfer. Progression toward goals:  [ ]    Improving appropriately and progressing toward goals  [X]    Improving slowly and progressing toward goals  [ ]    Not making progress toward goals and plan of care will be adjusted       PLAN:  Patient continues to benefit from skilled intervention to address the above impairments. Continue treatment per established plan of care.   Discharge Recommendations:  None  Further Equipment Recommendations for Discharge:  none       SUBJECTIVE:   Patient stated I think I am a little better.       OBJECTIVE DATA SUMMARY:   Critical Behavior:  Neurologic State: Alert  Orientation Level: Oriented X4  Cognition: Follows commands  Safety/Judgement: Awareness of environment  Functional Mobility Training:  Bed Mobility:     Supine to Sit: Moderate assistance  Sit to Supine: Minimum assistance           Transfers:  Sit to Stand: Minimum assistance  Stand to Sit: Contact guard assistance                             Balance:  Standing - Static: Good  Standing - Dynamic : Good  Ambulation/Gait Training:  Distance (ft): 75 Feet (ft)  Assistive Device: Gait belt  Ambulation - Level of Assistance: Contact guard assistance        Gait Abnormalities: Altered arm swing              Speed/Kena: Pace decreased (<100 feet/min)                                  Stairs:                       Neuro Re-Education:     Therapeutic Exercises:      Pain:  Pain Scale 1: Numeric (0 - 10)  Pain Intensity 1: 6  Pain Location 1: Abdomen  Pain Orientation 1: Anterior  Pain Description 1: Aching  Pain Intervention(s) 1: Medication (see MAR)  Activity Tolerance:   Good  Please refer to the flowsheet for vital signs taken during this treatment.   After treatment:   [ ]    Patient left in no apparent distress sitting up in chair  [X]    Patient left in no apparent distress in bed  [X]    Call bell left within reach  [X]    Nursing notified  [X]    Caregiver present  [X]    Bed alarm activated      COMMUNICATION/COLLABORATION:   The patients plan of care was discussed with: Registered Nurse     Clearance Lira   Time Calculation: 15 mins

## 2017-05-18 NOTE — PROGRESS NOTES
Dressing change completed:  Wet to dry packing covered with dry 4 X 4 dressing.     Patient back to baseline of conversing, smiling, and being more interactive

## 2017-05-18 NOTE — PROGRESS NOTES
General Surgery Daily Progress Note    Patient: Ronnie Black MRN: [id number]  SSN: [id number]    YOB: 1940  Age: 69 y.o. Sex: female      Admit Date: 5/12/2017    Subjective:   Patient developed increased pain and distention yesterday afternoon, halting planned discharge. Continues to have bowel function. CT shows td-anastomotic inflammatory changes and a few small locules of extraluminal air.      Current Facility-Administered Medications   Medication Dose Route Frequency    ketorolac (TORADOL) injection 15 mg  15 mg IntraVENous Q6H PRN    dextrose 5% - 0.45% NaCl with KCl 20 mEq/L infusion  75 mL/hr IntraVENous CONTINUOUS    piperacillin-tazobactam (ZOSYN) 3.375 g in 0.9% sodium chloride (MBP/ADV) 100 mL MBP  3.375 g IntraVENous Q8H    enoxaparin (LOVENOX) injection 40 mg  40 mg SubCUTAneous Q24H    lidocaine (LIDODERM) 5 % patch 1 Patch  1 Patch TransDERmal Q24H    oxyCODONE IR (ROXICODONE) tablet 2.5 mg  2.5 mg Oral Q4H PRN    oxyCODONE IR (ROXICODONE) tablet 5 mg  5 mg Oral Q4H PRN    HYDROmorphone (PF) (DILAUDID) injection 0.5 mg  0.5 mg IntraVENous Q4H PRN    ondansetron (ZOFRAN) injection 4 mg  4 mg IntraVENous Q6H PRN    amLODIPine (NORVASC) tablet 5 mg  5 mg Oral DAILY    pantoprazole (PROTONIX) tablet 40 mg  40 mg Oral ACB    gabapentin (NEURONTIN) capsule 600 mg  600 mg Oral TID    hydroCHLOROthiazide (HYDRODIURIL) tablet 25 mg  25 mg Oral DAILY    levothyroxine (SYNTHROID) tablet 50 mcg  50 mcg Oral ACB    acetaminophen (TYLENOL) tablet 650 mg  650 mg Oral TIDAC    venlafaxine-SR (EFFEXOR-XR) capsule 150 mg  150 mg Oral DAILY WITH BREAKFAST        Objective:   05/18 0701 - 05/18 1900  In: -   Out: 30 [Drains:30]  05/16 1901 - 05/18 0700  In: -   Out: 90 [Drains:90]  Patient Vitals for the past 8 hrs:   BP Temp Pulse Resp SpO2   05/18/17 0825 (!) 162/109 97.2 °F (36.2 °C) 73 18 92 %   05/18/17 0533 172/72 97.6 °F (36.4 °C) 75 19 97 %       Physical Exam:  General: Alert, cooperative, NAD  Lungs: Clear to auscultation bilaterally  Heart:  Regular rate and rhythm  Abdomen: Soft, ATTP, non-distended. + bowel sounds. Incisions c/d/o. VALERIA drain SS. Extremities: Warm, moves all, no edema  Skin:  Warm and dry, no rash    Labs:   Recent Labs      05/16/17 0220   WBC  6.2   HGB  10.5*   HCT  31.0*   PLT  235     Recent Labs      05/16/17 0220   NA  140   K  3.4*   CL  104   CO2  29   GLU  101*   BUN  8   CREA  0.74   CA  8.0*   MG  1.5*       Assessment / Plan:   · POD#6 lap right colectomy  · CT findings raise concern for small anastomotic leak. No leukocytosis, fever, ileus, or abscess to suggest this requires intervention at this point. NPO, IVF, ABX and close observation.    · Continue LLQ wound packing  · OOB and ambulate

## 2017-05-19 LAB
ANION GAP BLD CALC-SCNC: 10 MMOL/L (ref 5–15)
BASOPHILS # BLD AUTO: 0 K/UL (ref 0–0.1)
BASOPHILS # BLD: 0 % (ref 0–1)
BUN SERPL-MCNC: 9 MG/DL (ref 6–20)
BUN/CREAT SERPL: 11 (ref 12–20)
CALCIUM SERPL-MCNC: 8.6 MG/DL (ref 8.5–10.1)
CHLORIDE SERPL-SCNC: 97 MMOL/L (ref 97–108)
CO2 SERPL-SCNC: 27 MMOL/L (ref 21–32)
CREAT SERPL-MCNC: 0.83 MG/DL (ref 0.55–1.02)
DIFFERENTIAL METHOD BLD: NORMAL
EOSINOPHIL # BLD: 0.1 K/UL (ref 0–0.4)
EOSINOPHIL NFR BLD: 1 % (ref 0–7)
ERYTHROCYTE [DISTWIDTH] IN BLOOD BY AUTOMATED COUNT: 13 % (ref 11.5–14.5)
GLUCOSE SERPL-MCNC: 95 MG/DL (ref 65–100)
HCT VFR BLD AUTO: 38.6 % (ref 35–47)
HGB BLD-MCNC: 13.1 G/DL (ref 11.5–16)
LYMPHOCYTES # BLD AUTO: 23 % (ref 12–49)
LYMPHOCYTES # BLD: 1.9 K/UL (ref 0.8–3.5)
MCH RBC QN AUTO: 32.3 PG (ref 26–34)
MCHC RBC AUTO-ENTMCNC: 33.9 G/DL (ref 30–36.5)
MCV RBC AUTO: 95.3 FL (ref 80–99)
MONOCYTES # BLD: 0.7 K/UL (ref 0–1)
MONOCYTES NFR BLD AUTO: 8 % (ref 5–13)
NEUTS SEG # BLD: 5.6 K/UL (ref 1.8–8)
NEUTS SEG NFR BLD AUTO: 68 % (ref 32–75)
PLATELET # BLD AUTO: 339 K/UL (ref 150–400)
POTASSIUM SERPL-SCNC: 3.1 MMOL/L (ref 3.5–5.1)
RBC # BLD AUTO: 4.05 M/UL (ref 3.8–5.2)
RBC MORPH BLD: NORMAL
SODIUM SERPL-SCNC: 134 MMOL/L (ref 136–145)
WBC # BLD AUTO: 8.3 K/UL (ref 3.6–11)
WBC MORPH BLD: NORMAL

## 2017-05-19 PROCEDURE — 65270000029 HC RM PRIVATE

## 2017-05-19 PROCEDURE — 74011250636 HC RX REV CODE- 250/636: Performed by: PHYSICIAN ASSISTANT

## 2017-05-19 PROCEDURE — 97535 SELF CARE MNGMENT TRAINING: CPT

## 2017-05-19 PROCEDURE — 74011250636 HC RX REV CODE- 250/636: Performed by: SURGERY

## 2017-05-19 PROCEDURE — 74011000258 HC RX REV CODE- 258: Performed by: PHYSICIAN ASSISTANT

## 2017-05-19 PROCEDURE — 74011250637 HC RX REV CODE- 250/637: Performed by: SURGERY

## 2017-05-19 PROCEDURE — 74011250637 HC RX REV CODE- 250/637: Performed by: PHYSICIAN ASSISTANT

## 2017-05-19 PROCEDURE — 85025 COMPLETE CBC W/AUTO DIFF WBC: CPT | Performed by: SURGERY

## 2017-05-19 PROCEDURE — 97116 GAIT TRAINING THERAPY: CPT

## 2017-05-19 PROCEDURE — 36415 COLL VENOUS BLD VENIPUNCTURE: CPT | Performed by: SURGERY

## 2017-05-19 PROCEDURE — 80048 BASIC METABOLIC PNL TOTAL CA: CPT | Performed by: SURGERY

## 2017-05-19 RX ADMIN — ACETAMINOPHEN 650 MG: 325 TABLET ORAL at 06:50

## 2017-05-19 RX ADMIN — ACETAMINOPHEN 650 MG: 325 TABLET ORAL at 16:02

## 2017-05-19 RX ADMIN — GABAPENTIN 600 MG: 300 CAPSULE ORAL at 21:03

## 2017-05-19 RX ADMIN — GABAPENTIN 600 MG: 300 CAPSULE ORAL at 01:41

## 2017-05-19 RX ADMIN — VENLAFAXINE HYDROCHLORIDE 150 MG: 150 CAPSULE, EXTENDED RELEASE ORAL at 09:02

## 2017-05-19 RX ADMIN — LEVOTHYROXINE SODIUM 50 MCG: 0.05 TABLET ORAL at 06:50

## 2017-05-19 RX ADMIN — ENOXAPARIN SODIUM 40 MG: 40 INJECTION SUBCUTANEOUS at 09:03

## 2017-05-19 RX ADMIN — PIPERACILLIN SODIUM,TAZOBACTAM SODIUM 3.38 G: 3; .375 INJECTION, POWDER, FOR SOLUTION INTRAVENOUS at 09:02

## 2017-05-19 RX ADMIN — KETOROLAC TROMETHAMINE 15 MG: 30 INJECTION, SOLUTION INTRAMUSCULAR at 17:09

## 2017-05-19 RX ADMIN — OXYCODONE HYDROCHLORIDE 5 MG: 5 TABLET ORAL at 09:02

## 2017-05-19 RX ADMIN — HYDROCHLOROTHIAZIDE 25 MG: 25 TABLET ORAL at 09:02

## 2017-05-19 RX ADMIN — OXYCODONE HYDROCHLORIDE 5 MG: 5 TABLET ORAL at 01:43

## 2017-05-19 RX ADMIN — ACETAMINOPHEN 650 MG: 325 TABLET ORAL at 12:10

## 2017-05-19 RX ADMIN — PIPERACILLIN SODIUM,TAZOBACTAM SODIUM 3.38 G: 3; .375 INJECTION, POWDER, FOR SOLUTION INTRAVENOUS at 17:09

## 2017-05-19 RX ADMIN — KETOROLAC TROMETHAMINE 15 MG: 30 INJECTION, SOLUTION INTRAMUSCULAR at 09:02

## 2017-05-19 RX ADMIN — GABAPENTIN 600 MG: 300 CAPSULE ORAL at 09:02

## 2017-05-19 RX ADMIN — PANTOPRAZOLE SODIUM 40 MG: 40 TABLET, DELAYED RELEASE ORAL at 06:50

## 2017-05-19 RX ADMIN — GABAPENTIN 600 MG: 300 CAPSULE ORAL at 16:02

## 2017-05-19 RX ADMIN — PIPERACILLIN SODIUM,TAZOBACTAM SODIUM 3.38 G: 3; .375 INJECTION, POWDER, FOR SOLUTION INTRAVENOUS at 01:40

## 2017-05-19 RX ADMIN — AMLODIPINE BESYLATE 5 MG: 5 TABLET ORAL at 09:02

## 2017-05-19 NOTE — PROGRESS NOTES
Problem: Self Care Deficits Care Plan (Adult)  Goal: *Acute Goals and Plan of Care (Insert Text)  Occupational Therapy Goals  Initiated 5/16/2017  1. Patient will perform grooming standing at sink with modified independence within 7 day(s). 2. Patient will perform bathing with modified independence within 7 day(s). 3. Patient will perform lower body dressing with modified independence within 7 day(s). 4. Patient will perform toilet transfers with modified independence within 7 day(s). 5. Patient will perform all aspects of toileting with modified independence within 7 day(s). 6. Patient will participate in upper extremity therapeutic exercise/activities with independence for 10 minutes within 7 day(s). 7. Patient will utilize energy conservation techniques during functional activities with verbal and visual cues within 7 day(s). OCCUPATIONAL THERAPY TREATMENT  Patient: Frida Mooney (45 y.o. female)  Date: 5/19/2017  Diagnosis: APPENDICITIS  Appendicitis <principal problem not specified>  Procedure(s) (LRB):  LAPAROSCOPIC TAKE DOWN OF SPLENIC FLEXURE  AND RIGHT COLECTOMY (N/A) 7 Days Post-Op  Precautions: Fall, Bed Alarm  Chart, occupational therapy assessment, plan of care, and goals were reviewed. ASSESSMENT:  Pt received seated on commode. She was able to perform her own bladder hygiene. Afterwards she stood at the sink to wash her hands with supervision. Pt stated her pain was at a \"40\". RN aware. Pt returned to bed for her dressings to be changed. O2 sats on room air 83%. Applied O2 and sats increased to 92% on 2 L. Recommend home health. Progression toward goals:  [ ]          Improving appropriately and progressing toward goals  [X]          Improving slowly and progressing toward goals  [ ]          Not making progress toward goals and plan of care will be adjusted       PLAN:  Patient continues to benefit from skilled intervention to address the above impairments.   Continue treatment per established plan of care. Discharge Recommendations:  Home health  Further Equipment Recommendations for Discharge:  None for OT       SUBJECTIVE:   Patient stated I need a little time to sit .  and referring to on the commode. OBJECTIVE DATA SUMMARY:   Cognitive/Behavioral Status:  Neurologic State: Alert  Orientation Level: Oriented X4  Cognition: Follows commands           Functional Mobility and Transfers for ADLs:              Bed Mobility:  Rolling: Contact guard assistance (education log roll and abdominal bracing with pillow)  Supine to Sit: Minimum assistance (+R railing)  Sit to Supine: Additional time;Minimum assistance  Scooting: Supervision                Transfers:  Sit to Stand: Stand-by asssistance  Functional Transfers  Toilet Transfer : Supervision     Balance:  Sitting: Intact  Standing: Without support  Standing - Static: Good  Standing - Dynamic : Good  ADL Intervention:        Grooming  Washing Hands: Supervision/set-up (standing at sink) no loss of balance to task. Toileting  Bladder Hygiene: Independent           Pain:  Pain Scale 1: Numeric (0 - 10)  Pain Intensity 1: 4  Pain Location 1: Abdomen  Pain Orientation 1: Anterior  Pain Description 1: Aching  Pain Intervention(s) 1: Medication (see MAR)     Activity Tolerance:    Fair  Please refer to the flowsheet for vital signs taken during this treatment.   After treatment:   [ ]  Patient left in no apparent distress sitting up in chair  [X]  Patient left in no apparent distress in bed  [X]  Call bell left within reach  [X]  Nursing notified  [ ]  Caregiver present  [ ]  Bed alarm activated      COMMUNICATION/COLLABORATION:   The patients plan of care was discussed with: Occupational Therapist and Registered Nurse     RACHELE Donis  Time Calculation: 15 mins

## 2017-05-19 NOTE — PROGRESS NOTES
Problem: Mobility Impaired (Adult and Pediatric)  Goal: *Acute Goals and Plan of Care (Insert Text)  Physical Therapy Goals  Initiated 5/15/2017  1. Patient will move from supine to sit and sit to supine , scoot up and down and roll side to side in bed with independence within 7 day(s). 2. Patient will transfer from bed to chair and chair to bed with modified independence using the least restrictive device within 7 day(s). 3. Patient will perform sit to stand with modified independence within 7 day(s). 4. Patient will ambulate with modified independence for 300 feet with the least restrictive device within 7 day(s). 5. Patient will ascend/descend 5 stairs with one handrail(s) with minimal assistance/contact guard assist within 7 day(s). PHYSICAL THERAPY TREATMENT  Patient: Ronnie Black (78 y.o. female)  Date: 5/19/2017  Diagnosis: APPENDICITIS  Appendicitis <principal problem not specified>  Procedure(s) (LRB):  LAPAROSCOPIC TAKE DOWN OF SPLENIC FLEXURE  AND RIGHT COLECTOMY (N/A) 7 Days Post-Op  Precautions: Fall, Bed Alarm      ASSESSMENT:  Pt Ronnie Black ambulates increased distance and requires decreased assistance for functional mobility. She continues to require cues/education for abdominal bracing with pillow for pain management with mobility. She reports abdominal binder was discussed but declined by MD in order to avoid abdominal pressure. Pt offering good participation, oriented x 4 but occasionally with statements that seem confused; she quickly redirects to other topics when asked for clarification. Progression toward goals:  [X]    Improving appropriately and progressing toward goals  [ ]    Improving slowly and progressing toward goals  [ ]    Not making progress toward goals and plan of care will be adjusted       PLAN:  Patient continues to benefit from skilled intervention to address the above impairments. Continue treatment per established plan of care.   Discharge Recommendations:  Home Health vs. None  Further Equipment Recommendations for Discharge:  None at this time       SUBJECTIVE:   Patient stated Maybe this way my  gets some time with his son.  Pt reporting her step-son lives nearby and has cancer; states she is here visiting from [facility]. Pt cleared for PT by RN, received supine with head of bed elevated, enjoying a game on her laptop. OBJECTIVE DATA SUMMARY:   Critical Behavior:  Neurologic State: Alert  Orientation Level: Oriented X4  Cognition: Follows commands  Safety/Judgement: Awareness of environment  Functional Mobility Training:  Bed Mobility:  Rolling: Contact guard assistance (education log roll and abdominal bracing with pillow)  Supine to Sit: Minimum assistance (+R railing)  Sit to Supine: Additional time;Minimum assistance  Scooting: Supervision        Transfers:  Sit to Stand: Stand-by asssistance  Stand to Sit: Stand-by asssistance                             Balance:  Sitting: Intact  Standing: Without support  Standing - Static: Good  Standing - Dynamic : Good  Ambulation/Gait Training:  Distance (ft): 110 Feet (ft)  Assistive Device: Gait belt  Ambulation - Level of Assistance: Stand-by asssistance                       Speed/Kena: Pace decreased (<100 feet/min)                                Mild path deviation x 2 with no loss of balance. Pt able to change direction, attend to environment without loss of balance. Pain:  Pain Scale 1: Numeric (0 - 10)  Pain Intensity 1: 2 when standing and ambulating. Pain Location 1: Abdomen  Pain Orientation 1: Anterior  Pain Description 1: Aching  Pain Intervention(s) 1: Medication (see MAR); rest; repositioned. Activity Tolerance:   Good for activities above. Please refer to the flowsheet for vital signs taken during this treatment.   After treatment:   [ ]    Patient left in no apparent distress sitting up in chair  [X]    Patient left in no apparent distress in bed  [X] Call bell left within reach  [X]    Nursing notified  [ ]    Caregiver present  [X]    Bed alarm activated      COMMUNICATION/COLLABORATION:   The patients plan of care was discussed with: Registered Nurse     Franca Pruitt, PT, DPT   Time Calculation: 20 mins

## 2017-05-19 NOTE — PROGRESS NOTES
Nutrition Assessment:    RECOMMENDATIONS/INTERVENTION(S):   Diet advancement per Surgery    Monitor plan of care & need for initiating nutrition support    ASSESSMENT:   5/19:  Follow-up. Pt continues w/ abd pain & distention. Per surgery, no fever & bowel function continues -  no plans for intervention. Continue w/ sips of Ensure TID & IVF (D5 @ 75 ml/hr). Per pt, she has not been drinking the Ensure Clear. Has been sipping water only. Encouraged PO of Ensure Clear over water for some nutritional benefit - pt pleasant & agreeable. Inadequate nutrition x 5 days. Minimal nutrition x 2 days. 5/18:  Pt seen for LOS. 69 yof admitted for appendicitis. POD #6 lap right colectomy. Diet was advanced to Regular, low fiber x 3 days post-op w/ fair PO. Spoke w/ RN, abd distention started yesterday. S/p CT showing concern for anastomotic leak - pt made NPO last night. Close observation, ABX. D5 w/ KCl @ 75 infusing. MD ordered Ensure Clear TID - sips. Called to have Ensure Clear delivered to pt room. Visited pt. Pt denies wt loss or decreased PO PTA. Unable to state UBW but did say \"I love to eat - I want a filet mignon. \"  MD notes pt reports of abd pain x 7 days PTA. Overwt per BMI. No wt hx. No signs of muscle wasting or SQ fat loss per NFPA conducted. No edema noted. Labs/meds reviewed. Lytes requiring repletion. SUBJECTIVE/OBJECTIVE:     Diet Order: Ensure Clear TID - sips  % Eaten:  No data found.     Pertinent Medications: [x] Reviewed - protonix, zofran    Chemistries:  Lab Results   Component Value Date/Time    Sodium 134 05/19/2017 04:48 AM    Potassium 3.1 05/19/2017 04:48 AM    Chloride 97 05/19/2017 04:48 AM    CO2 27 05/19/2017 04:48 AM    Anion gap 10 05/19/2017 04:48 AM    Glucose 95 05/19/2017 04:48 AM    BUN 9 05/19/2017 04:48 AM    Creatinine 0.83 05/19/2017 04:48 AM    BUN/Creatinine ratio 11 05/19/2017 04:48 AM    GFR est AA >60 05/19/2017 04:48 AM    GFR est non-AA >60 05/19/2017 04:48 AM    Calcium 8.6 05/19/2017 04:48 AM    AST (SGOT) 29 05/12/2017 03:13 PM    Alk. phosphatase 43 05/12/2017 03:13 PM    Protein, total 8.3 05/12/2017 03:13 PM    Albumin 3.5 05/12/2017 03:13 PM    Globulin 4.8 05/12/2017 03:13 PM    A-G Ratio 0.7 05/12/2017 03:13 PM    ALT (SGPT) 30 05/12/2017 03:13 PM      Anthropometrics: Height: 5' (152.4 cm) Weight: 68 kg (149 lb 14.6 oz)    IBW (%IBW): 45.4 kg (100 lb) (149.91 %) UBW (%UBW):   (  %)    BMI: Body mass index is 29.28 kg/(m^2). This BMI is indicative of:   [] Underweight    [] Normal    [x] Overweight    []  Obesity    []  Extreme Obesity (BMI>40)  Estimated Nutrition Needs (Based on): 1418 Kcals/day (RMR (1091) x 1.3 AF) , 74 g (-88 (1.1-1.3 g/kg x actual body wt)) Protein  Carbohydrate:  At Least 130 g/day  Fluids: 1400 mL/day    Last BM: 5/18, abd tender, distended   [x]Active     []Hyperactive  []Hypoactive       [] Absent   BS  Skin:    [] Intact   [x] Incision  [] Breakdown   [] DTI   [] Tears/Excoriation/Abrasion  []Edema [x] Other:VALERIA drain     Wt Readings from Last 30 Encounters:   05/18/17 68 kg (149 lb 14.6 oz)      NUTRITION DIAGNOSES:   Problem:  Inadequate oral food/beverage intake     Etiology: related to altered GI function      Signs/Symptoms: as evidenced by NPO & s/p lap right colectomy, concern for anastomotic leak      NUTRITION INTERVENTIONS:                General, healthful diet; commercial supplement - once PO initiated     GOAL:   Initiate PO diet w/ no s/s of intolerance in the next 1-3 days    Cultural, Scientologist, or Ethnic Dietary Needs: None     LEARNING NEEDS (Diet, Food/Nutrient-Drug Interaction):    [x] None Identified   [] Identified and Education Provided/Documented   [] Identified and Pt declined/was not appropriate      [] Interdisciplinary Care Plan Reviewed/Documented    [] Discharge Needs:    TBD   [] No Nutrition Related Discharge Needs    NUTRITION RISK:   Pt Is At Nutrition Risk  [x]     No Nutrition Risk Identified  []       PT SEEN FOR:    []  MD Consult: []Calorie Count      []Diabetic Diet Education        []Diet Education     []Electrolyte Management     []General Nutrition Management and Supplements     []Management of Tube Feeding     []TPN Recommendations    []  RN Referral:  []MST score >=2     []Enteral/Parenteral Nutrition PTA     []Pregnant: Gestational DM or Multigestation                 [] Pressure Ulcer    []  Low BMI      []  Length of Stay       [] Dysphagia Diet         [] Ventilator  [x]  Follow-up     Previous Recommendations:   [] Implemented    [x] Progressing Towards Goal            [] Not Implemented          [] Not Applicable    Previous Goal:   [] Met              [x] Progressing Towards Goal              [] Not Progressing Towards Goal   [] Not Applicable            Jolanda Landau, [address] [place] [address]   Pager [phone]

## 2017-05-19 NOTE — PROGRESS NOTES
General Surgery Daily Progress Note    Patient: Jennifer Razo MRN: [id number]  SSN: [id number]    YOB: 1940  Age: 69 y.o. Sex: female      Admit Date: 5/12/2017    Subjective:   Pain unchanged, bowel function continues. No fever    Current Facility-Administered Medications   Medication Dose Route Frequency    ketorolac (TORADOL) injection 15 mg  15 mg IntraVENous Q6H PRN    dextrose 5% - 0.45% NaCl with KCl 20 mEq/L infusion  75 mL/hr IntraVENous CONTINUOUS    piperacillin-tazobactam (ZOSYN) 3.375 g in 0.9% sodium chloride (MBP/ADV) 100 mL MBP  3.375 g IntraVENous Q8H    enoxaparin (LOVENOX) injection 40 mg  40 mg SubCUTAneous Q24H    lidocaine (LIDODERM) 5 % patch 1 Patch  1 Patch TransDERmal Q24H    oxyCODONE IR (ROXICODONE) tablet 2.5 mg  2.5 mg Oral Q4H PRN    oxyCODONE IR (ROXICODONE) tablet 5 mg  5 mg Oral Q4H PRN    HYDROmorphone (PF) (DILAUDID) injection 0.5 mg  0.5 mg IntraVENous Q4H PRN    ondansetron (ZOFRAN) injection 4 mg  4 mg IntraVENous Q6H PRN    amLODIPine (NORVASC) tablet 5 mg  5 mg Oral DAILY    pantoprazole (PROTONIX) tablet 40 mg  40 mg Oral ACB    gabapentin (NEURONTIN) capsule 600 mg  600 mg Oral TID    hydroCHLOROthiazide (HYDRODIURIL) tablet 25 mg  25 mg Oral DAILY    levothyroxine (SYNTHROID) tablet 50 mcg  50 mcg Oral ACB    acetaminophen (TYLENOL) tablet 650 mg  650 mg Oral TIDAC    venlafaxine-SR (EFFEXOR-XR) capsule 150 mg  150 mg Oral DAILY WITH BREAKFAST        Objective:      05/17 1901 - 05/19 0700  In: 550 [I.V.:550]  Out: 30 [Drains:30]  Patient Vitals for the past 8 hrs:   BP Temp Pulse Resp SpO2   05/19/17 1137 158/69 98.2 °F (36.8 °C) 65 17 93 %   05/19/17 0900 139/71 98.2 °F (36.8 °C) 82 18 90 %       Physical Exam:  General: Alert, cooperative, NAD  Lungs: Clear to auscultation bilaterally  Heart:  Regular rate and rhythm  Abdomen: Soft, ATTP, non-distended. + bowel sounds. Incisions c/d/o. VALERIA drain SS.    Extremities: Warm, moves all, no edema  Skin:  Warm and dry, no rash    Labs:   Recent Labs      05/19/17   0448   WBC  8.3   HGB  13.1   HCT  38.6   PLT  339     Recent Labs      05/19/17   0448   NA  134*   K  3.1*   CL  97   CO2  27   GLU  95   BUN  9   CREA  0.83   CA  8.6       Assessment / Plan:   · POD#7 lap right colectomy  · CT findings raise concern for small anastomotic leak but clinically she is well  · Continue IVF, ABX, NPO w/ sips through the weekend   · Continue LLQ wound packing  · OOB and ambulate  · No plan for discharge this weekend.   · Pathology- goblet cell carcinoid pT3N0, negative margins

## 2017-05-20 LAB
ANION GAP BLD CALC-SCNC: 11 MMOL/L (ref 5–15)
BASOPHILS # BLD AUTO: 0 K/UL (ref 0–0.1)
BASOPHILS # BLD: 0 % (ref 0–1)
BUN SERPL-MCNC: 9 MG/DL (ref 6–20)
BUN/CREAT SERPL: 11 (ref 12–20)
CALCIUM SERPL-MCNC: 8.6 MG/DL (ref 8.5–10.1)
CHLORIDE SERPL-SCNC: 99 MMOL/L (ref 97–108)
CO2 SERPL-SCNC: 27 MMOL/L (ref 21–32)
CREAT SERPL-MCNC: 0.79 MG/DL (ref 0.55–1.02)
DIFFERENTIAL METHOD BLD: NORMAL
EOSINOPHIL # BLD: 0.1 K/UL (ref 0–0.4)
EOSINOPHIL NFR BLD: 1 % (ref 0–7)
ERYTHROCYTE [DISTWIDTH] IN BLOOD BY AUTOMATED COUNT: 13.2 % (ref 11.5–14.5)
GLUCOSE SERPL-MCNC: 92 MG/DL (ref 65–100)
HCT VFR BLD AUTO: 39 % (ref 35–47)
HGB BLD-MCNC: 13.4 G/DL (ref 11.5–16)
LYMPHOCYTES # BLD AUTO: 23 % (ref 12–49)
LYMPHOCYTES # BLD: 1.8 K/UL (ref 0.8–3.5)
MAGNESIUM SERPL-MCNC: 1.8 MG/DL (ref 1.6–2.4)
MCH RBC QN AUTO: 32.7 PG (ref 26–34)
MCHC RBC AUTO-ENTMCNC: 34.4 G/DL (ref 30–36.5)
MCV RBC AUTO: 95.1 FL (ref 80–99)
MONOCYTES # BLD: 0.5 K/UL (ref 0–1)
MONOCYTES NFR BLD AUTO: 7 % (ref 5–13)
MYELOCYTES NFR BLD MANUAL: 1 %
NEUTS SEG # BLD: 5.3 K/UL (ref 1.8–8)
NEUTS SEG NFR BLD AUTO: 68 % (ref 32–75)
PLATELET # BLD AUTO: 385 K/UL (ref 150–400)
POTASSIUM SERPL-SCNC: 4 MMOL/L (ref 3.5–5.1)
RBC # BLD AUTO: 4.1 M/UL (ref 3.8–5.2)
RBC MORPH BLD: NORMAL
SODIUM SERPL-SCNC: 137 MMOL/L (ref 136–145)
WBC # BLD AUTO: 7.8 K/UL (ref 3.6–11)
WBC MORPH BLD: NORMAL

## 2017-05-20 PROCEDURE — 74011000258 HC RX REV CODE- 258: Performed by: PHYSICIAN ASSISTANT

## 2017-05-20 PROCEDURE — 74011250636 HC RX REV CODE- 250/636: Performed by: SURGERY

## 2017-05-20 PROCEDURE — 65270000029 HC RM PRIVATE

## 2017-05-20 PROCEDURE — 80048 BASIC METABOLIC PNL TOTAL CA: CPT | Performed by: PHYSICIAN ASSISTANT

## 2017-05-20 PROCEDURE — 74011250637 HC RX REV CODE- 250/637: Performed by: SURGERY

## 2017-05-20 PROCEDURE — 74011250636 HC RX REV CODE- 250/636: Performed by: PHYSICIAN ASSISTANT

## 2017-05-20 PROCEDURE — 36415 COLL VENOUS BLD VENIPUNCTURE: CPT | Performed by: PHYSICIAN ASSISTANT

## 2017-05-20 PROCEDURE — 85025 COMPLETE CBC W/AUTO DIFF WBC: CPT | Performed by: PHYSICIAN ASSISTANT

## 2017-05-20 PROCEDURE — 74011250637 HC RX REV CODE- 250/637: Performed by: PHYSICIAN ASSISTANT

## 2017-05-20 PROCEDURE — 83735 ASSAY OF MAGNESIUM: CPT | Performed by: PHYSICIAN ASSISTANT

## 2017-05-20 RX ORDER — DIAZEPAM 10 MG/2ML
2.5 INJECTION INTRAMUSCULAR
Status: DISCONTINUED | OUTPATIENT
Start: 2017-05-20 | End: 2017-05-25 | Stop reason: HOSPADM

## 2017-05-20 RX ADMIN — LEVOTHYROXINE SODIUM 50 MCG: 0.05 TABLET ORAL at 06:45

## 2017-05-20 RX ADMIN — AMLODIPINE BESYLATE 5 MG: 5 TABLET ORAL at 09:08

## 2017-05-20 RX ADMIN — HYDROCHLOROTHIAZIDE 25 MG: 25 TABLET ORAL at 09:09

## 2017-05-20 RX ADMIN — VENLAFAXINE HYDROCHLORIDE 150 MG: 150 CAPSULE, EXTENDED RELEASE ORAL at 09:08

## 2017-05-20 RX ADMIN — ACETAMINOPHEN 650 MG: 325 TABLET ORAL at 10:34

## 2017-05-20 RX ADMIN — PIPERACILLIN SODIUM,TAZOBACTAM SODIUM 3.38 G: 3; .375 INJECTION, POWDER, FOR SOLUTION INTRAVENOUS at 09:10

## 2017-05-20 RX ADMIN — DIAZEPAM 2.5 MG: 5 INJECTION, SOLUTION INTRAMUSCULAR; INTRAVENOUS at 10:34

## 2017-05-20 RX ADMIN — GABAPENTIN 600 MG: 300 CAPSULE ORAL at 21:45

## 2017-05-20 RX ADMIN — ENOXAPARIN SODIUM 40 MG: 40 INJECTION SUBCUTANEOUS at 09:08

## 2017-05-20 RX ADMIN — GABAPENTIN 600 MG: 300 CAPSULE ORAL at 16:31

## 2017-05-20 RX ADMIN — ACETAMINOPHEN 650 MG: 325 TABLET ORAL at 16:31

## 2017-05-20 RX ADMIN — ACETAMINOPHEN 650 MG: 325 TABLET ORAL at 06:45

## 2017-05-20 RX ADMIN — OXYCODONE HYDROCHLORIDE 5 MG: 5 TABLET ORAL at 21:45

## 2017-05-20 RX ADMIN — PIPERACILLIN SODIUM,TAZOBACTAM SODIUM 3.38 G: 3; .375 INJECTION, POWDER, FOR SOLUTION INTRAVENOUS at 16:31

## 2017-05-20 RX ADMIN — GABAPENTIN 600 MG: 300 CAPSULE ORAL at 09:08

## 2017-05-20 RX ADMIN — PANTOPRAZOLE SODIUM 40 MG: 40 TABLET, DELAYED RELEASE ORAL at 06:45

## 2017-05-20 RX ADMIN — PIPERACILLIN SODIUM,TAZOBACTAM SODIUM 3.38 G: 3; .375 INJECTION, POWDER, FOR SOLUTION INTRAVENOUS at 03:01

## 2017-05-20 RX ADMIN — DEXTROSE MONOHYDRATE, SODIUM CHLORIDE, AND POTASSIUM CHLORIDE 75 ML/HR: 50; 4.5; 1.49 INJECTION, SOLUTION INTRAVENOUS at 15:11

## 2017-05-20 RX ADMIN — DIAZEPAM 2.5 MG: 5 INJECTION, SOLUTION INTRAMUSCULAR; INTRAVENOUS at 17:10

## 2017-05-20 NOTE — PROGRESS NOTES
Bedside and Verbal shift change report given to Meriam Boas, RN (oncoming nurse) by Kwame Ellis RN (offgoing nurse). Report included the following information SBAR, Kardex, OR Summary, Procedure Summary, Intake/Output and MAR.

## 2017-05-20 NOTE — PROGRESS NOTES
General Surgery Progress Note      S: Having muscle tightness of the right abdomen not relieved with current pain regimen. Per nursing is not taking pain medication. Passing flatus. BM recorded. Patient Vitals for the past 24 hrs:   Temp Pulse Resp BP SpO2   05/20/17 0748 97.5 °F (36.4 °C) 69 18 133/64 95 %   05/19/17 1854 97.4 °F (36.3 °C) 69 17 147/67 97 %   05/19/17 1137 98.2 °F (36.8 °C) 65 17 158/69 93 %           Date 05/19/17 0700 - 05/20/17 0659 05/20/17 0700 - 05/21/17 0659   Shift 2319-0757 0535-3308 24 Hour Total 8837-1410 2436-6538 24 Hour Total   I  N  T  A  K  E   I.V.  (mL/kg/hr)  0  (0) 0  (0)         Volume (dextrose 5% - 0.45% NaCl with KCl 20 mEq/L infusion)  0 0         Volume (piperacillin-tazobactam (ZOSYN) 3.375 g in 0.9% sodium chloride (MBP/ADV) 100 mL MBP)  0 0       Shift Total  (mL/kg)  0  (0) 0  (0)      O  U  T  P  U  T   Urine  (mL/kg/hr)            Urine Occurrence(s) 1 x 1 x 2 x       Drains 30 5 35         Output (ml) (Junior-Canales Drain 05/12/17 Anterior Abdomen) 30 5 35       Stool 1  1         Stool 1  1       Shift Total  (mL/kg) 31  (0.5) 5  (0.1) 36  (0.6)      NET -31 -5 -36      Weight (kg) 68 65 65 65 65 65           Physical Exam:      General: NAD, A&Ox3  Resp: CTAB  CV: RRR  Abdomen: soft, appropriately tender, no peritonitis, non-distended.  VALERIA drain in place with serosanguinous output  Extremity: SCDs in place, no edema    Lab Results   Component Value Date/Time    WBC 7.8 05/20/2017 06:43 AM    HGB 13.4 05/20/2017 06:43 AM    HCT 39.0 05/20/2017 06:43 AM    PLATELET 129 66/30/6326 06:43 AM    MCV 95.1 05/20/2017 06:43 AM       Lab Results   Component Value Date/Time    Sodium 137 05/20/2017 06:43 AM    Potassium 4.0 05/20/2017 06:43 AM    Chloride 99 05/20/2017 06:43 AM    CO2 27 05/20/2017 06:43 AM    Anion gap 11 05/20/2017 06:43 AM    Glucose 92 05/20/2017 06:43 AM    BUN 9 05/20/2017 06:43 AM    Creatinine 0.79 05/20/2017 06:43 AM    BUN/Creatinine ratio 11 05/20/2017 06:43 AM    GFR est AA >60 05/20/2017 06:43 AM    GFR est non-AA >60 05/20/2017 06:43 AM    Calcium 8.6 05/20/2017 06:43 AM        No results found for: INR, PTMR, PTP, PT1, PT2        A/P:  77F POD 8 s/p laparoscopic right hemicolectomy. Concern for contained anastomotic leak.     Continue Zosyn  NPO with sips of clears  Valium 2.5 mg q6 PRN muscle spasm ordered  OOB to chair  PT following  On Lovenox for DVT chemoprophylaxis      Raghu Bolaños MD

## 2017-05-20 NOTE — PROGRESS NOTES
Bedside and Verbal shift change report given to [employer], RN (oncoming nurse) by Pedro García RN (offgoing nurse). Report included the following information SBAR, Kardex, Intake/Output and Recent Results.

## 2017-05-21 LAB
ANION GAP BLD CALC-SCNC: 10 MMOL/L (ref 5–15)
BASOPHILS # BLD AUTO: 0 K/UL (ref 0–0.1)
BASOPHILS # BLD: 0 % (ref 0–1)
BUN SERPL-MCNC: 6 MG/DL (ref 6–20)
BUN/CREAT SERPL: 7 (ref 12–20)
CALCIUM SERPL-MCNC: 9.1 MG/DL (ref 8.5–10.1)
CHLORIDE SERPL-SCNC: 97 MMOL/L (ref 97–108)
CO2 SERPL-SCNC: 27 MMOL/L (ref 21–32)
CREAT SERPL-MCNC: 0.87 MG/DL (ref 0.55–1.02)
EOSINOPHIL # BLD: 0.3 K/UL (ref 0–0.4)
EOSINOPHIL NFR BLD: 3 % (ref 0–7)
ERYTHROCYTE [DISTWIDTH] IN BLOOD BY AUTOMATED COUNT: 13.5 % (ref 11.5–14.5)
GLUCOSE SERPL-MCNC: 101 MG/DL (ref 65–100)
HCT VFR BLD AUTO: 42.3 % (ref 35–47)
HGB BLD-MCNC: 14.4 G/DL (ref 11.5–16)
LYMPHOCYTES # BLD AUTO: 32 % (ref 12–49)
LYMPHOCYTES # BLD: 3.1 K/UL (ref 0.8–3.5)
MAGNESIUM SERPL-MCNC: 1.7 MG/DL (ref 1.6–2.4)
MCH RBC QN AUTO: 32.7 PG (ref 26–34)
MCHC RBC AUTO-ENTMCNC: 34 G/DL (ref 30–36.5)
MCV RBC AUTO: 96.1 FL (ref 80–99)
MONOCYTES # BLD: 0.9 K/UL (ref 0–1)
MONOCYTES NFR BLD AUTO: 9 % (ref 5–13)
NEUTS SEG # BLD: 5.3 K/UL (ref 1.8–8)
NEUTS SEG NFR BLD AUTO: 56 % (ref 32–75)
PLATELET # BLD AUTO: 456 K/UL (ref 150–400)
POTASSIUM SERPL-SCNC: 3.6 MMOL/L (ref 3.5–5.1)
RBC # BLD AUTO: 4.4 M/UL (ref 3.8–5.2)
SODIUM SERPL-SCNC: 134 MMOL/L (ref 136–145)
WBC # BLD AUTO: 9.6 K/UL (ref 3.6–11)

## 2017-05-21 PROCEDURE — 74011000258 HC RX REV CODE- 258: Performed by: PHYSICIAN ASSISTANT

## 2017-05-21 PROCEDURE — 74011250636 HC RX REV CODE- 250/636: Performed by: PHYSICIAN ASSISTANT

## 2017-05-21 PROCEDURE — 74011250637 HC RX REV CODE- 250/637: Performed by: PHYSICIAN ASSISTANT

## 2017-05-21 PROCEDURE — 36415 COLL VENOUS BLD VENIPUNCTURE: CPT | Performed by: PHYSICIAN ASSISTANT

## 2017-05-21 PROCEDURE — 74011250636 HC RX REV CODE- 250/636: Performed by: SURGERY

## 2017-05-21 PROCEDURE — 80048 BASIC METABOLIC PNL TOTAL CA: CPT | Performed by: PHYSICIAN ASSISTANT

## 2017-05-21 PROCEDURE — 65270000029 HC RM PRIVATE

## 2017-05-21 PROCEDURE — 83735 ASSAY OF MAGNESIUM: CPT | Performed by: PHYSICIAN ASSISTANT

## 2017-05-21 PROCEDURE — 85025 COMPLETE CBC W/AUTO DIFF WBC: CPT | Performed by: PHYSICIAN ASSISTANT

## 2017-05-21 PROCEDURE — 74011250637 HC RX REV CODE- 250/637: Performed by: SURGERY

## 2017-05-21 RX ADMIN — HYDROCHLOROTHIAZIDE 25 MG: 25 TABLET ORAL at 08:11

## 2017-05-21 RX ADMIN — GABAPENTIN 600 MG: 300 CAPSULE ORAL at 17:05

## 2017-05-21 RX ADMIN — AMLODIPINE BESYLATE 5 MG: 5 TABLET ORAL at 08:11

## 2017-05-21 RX ADMIN — DEXTROSE MONOHYDRATE, SODIUM CHLORIDE, AND POTASSIUM CHLORIDE 75 ML/HR: 50; 4.5; 1.49 INJECTION, SOLUTION INTRAVENOUS at 22:25

## 2017-05-21 RX ADMIN — DIAZEPAM 2.5 MG: 5 INJECTION, SOLUTION INTRAMUSCULAR; INTRAVENOUS at 22:25

## 2017-05-21 RX ADMIN — OXYCODONE HYDROCHLORIDE 5 MG: 5 TABLET ORAL at 04:10

## 2017-05-21 RX ADMIN — PIPERACILLIN SODIUM,TAZOBACTAM SODIUM 3.38 G: 3; .375 INJECTION, POWDER, FOR SOLUTION INTRAVENOUS at 02:11

## 2017-05-21 RX ADMIN — VENLAFAXINE HYDROCHLORIDE 150 MG: 150 CAPSULE, EXTENDED RELEASE ORAL at 08:11

## 2017-05-21 RX ADMIN — ACETAMINOPHEN 650 MG: 325 TABLET ORAL at 06:30

## 2017-05-21 RX ADMIN — GABAPENTIN 600 MG: 300 CAPSULE ORAL at 08:11

## 2017-05-21 RX ADMIN — GABAPENTIN 600 MG: 300 CAPSULE ORAL at 22:25

## 2017-05-21 RX ADMIN — ACETAMINOPHEN 650 MG: 325 TABLET ORAL at 17:05

## 2017-05-21 RX ADMIN — LEVOTHYROXINE SODIUM 50 MCG: 0.05 TABLET ORAL at 06:30

## 2017-05-21 RX ADMIN — PANTOPRAZOLE SODIUM 40 MG: 40 TABLET, DELAYED RELEASE ORAL at 06:30

## 2017-05-21 RX ADMIN — ENOXAPARIN SODIUM 40 MG: 40 INJECTION SUBCUTANEOUS at 08:11

## 2017-05-21 RX ADMIN — DIAZEPAM 2.5 MG: 5 INJECTION, SOLUTION INTRAMUSCULAR; INTRAVENOUS at 13:39

## 2017-05-21 RX ADMIN — ACETAMINOPHEN 650 MG: 325 TABLET ORAL at 12:55

## 2017-05-21 NOTE — PROGRESS NOTES
General Surgery Progress Note      S: Pain improved with valium. Passing flatus. Having BMs. Patient Vitals for the past 24 hrs:   Temp Pulse Resp BP SpO2   05/21/17 0742 98.1 °F (36.7 °C) 71 18 138/60 93 %   05/21/17 0429 98.3 °F (36.8 °C) 75 17 146/67 96 %   05/20/17 2255 97.9 °F (36.6 °C) 75 16 140/68 95 %   05/20/17 1915 98.3 °F (36.8 °C) 89 19 155/69 99 %   05/20/17 1523 97.8 °F (36.6 °C) 73 17 158/66 95 %   05/20/17 1139 97.8 °F (36.6 °C) 80 16 120/61 98 %           Date 05/20/17 0700 - 05/21/17 0659 05/21/17 0700 - 05/22/17 0659   Shift 9527-6692 4893-1535 24 Hour Total 4041-2557 7939-3963 24 Hour Total   I  N  T  A  K  E   I.V.  (mL/kg/hr) 717.5  (0.9) 0  (0) 717.5  (0.5) 802.5  802.5      Volume (dextrose 5% - 0.45% NaCl with KCl 20 mEq/L infusion) 717.5 0 717.5 802.5  802.5      Volume (piperacillin-tazobactam (ZOSYN) 3.375 g in 0.9% sodium chloride (MBP/ADV) 100 mL MBP)  0 0       Shift Total  (mL/kg) 717.5  (11) 0  (0) 717.5  (11.3) 802.5  (12.6)  802.5  (12.6)   O  U  T  P  U  T   Urine  (mL/kg/hr)  1  (0) 1  (0)         Urine Voided  1 1         Urine Occurrence(s)  1 x 1 x       Drains 20 5 25 20  20      Output (ml) (Junior-Canales Drain 05/12/17 Anterior Abdomen) 20 5 25 20  20    Stool            Stool Occurrence(s) 1 x  1 x       Shift Total  (mL/kg) 20  (0.3) 6  (0.1) 26  (0.4) 20  (0.3)  20  (0.3)   .5 -6 691.5 782.5  782.5   Weight (kg) 65 63.5 63.5 63.5 63.5 63.5           Physical Exam:      General: NAD, A&Ox3  Resp: CTAB  CV: RRR  Abdomen: soft, appropriately tender, no peritonitis, non-distended.  VALERIA drain in place with serosanguinous output  Extremity: SCDs in place, no edema    Lab Results   Component Value Date/Time    WBC 9.6 05/21/2017 02:15 AM    HGB 14.4 05/21/2017 02:15 AM    HCT 42.3 05/21/2017 02:15 AM    PLATELET 249 91/78/2955 02:15 AM    MCV 96.1 05/21/2017 02:15 AM       Lab Results   Component Value Date/Time    Sodium 134 05/21/2017 02:15 AM    Potassium 3.6 05/21/2017 02:15 AM    Chloride 97 05/21/2017 02:15 AM    CO2 27 05/21/2017 02:15 AM    Anion gap 10 05/21/2017 02:15 AM    Glucose 101 05/21/2017 02:15 AM    BUN 6 05/21/2017 02:15 AM    Creatinine 0.87 05/21/2017 02:15 AM    BUN/Creatinine ratio 7 05/21/2017 02:15 AM    GFR est AA >60 05/21/2017 02:15 AM    GFR est non-AA >60 05/21/2017 02:15 AM    Calcium 9.1 05/21/2017 02:15 AM        No results found for: INR, PTMR, PTP, PT1, PT2        A/P:  77F POD 9 s/p laparoscopic right hemicolectomy. Concern for contained anastomotic leak. Doing well.      Continue Zosyn  NPO with sips of clears  Valium 2.5 mg q6 PRN muscle spasm ordered  OOB to chair  PT following  On Lovenox for DVT chemoprophylaxis      Marcell Hunter MD

## 2017-05-21 NOTE — PROGRESS NOTES
Bedside and Verbal shift change report given to Izabela Moore RN (oncoming nurse) by Ramon Boxer, RN (offgoing nurse). Report included the following information SBAR, Kardex, MAR and Recent Results.

## 2017-05-22 LAB
ANION GAP BLD CALC-SCNC: 10 MMOL/L (ref 5–15)
BASOPHILS # BLD AUTO: 0 K/UL (ref 0–0.1)
BASOPHILS # BLD: 0 % (ref 0–1)
BUN SERPL-MCNC: 7 MG/DL (ref 6–20)
BUN/CREAT SERPL: 9 (ref 12–20)
CALCIUM SERPL-MCNC: 9.2 MG/DL (ref 8.5–10.1)
CHLORIDE SERPL-SCNC: 98 MMOL/L (ref 97–108)
CO2 SERPL-SCNC: 25 MMOL/L (ref 21–32)
CREAT SERPL-MCNC: 0.75 MG/DL (ref 0.55–1.02)
EOSINOPHIL # BLD: 0.3 K/UL (ref 0–0.4)
EOSINOPHIL NFR BLD: 4 % (ref 0–7)
ERYTHROCYTE [DISTWIDTH] IN BLOOD BY AUTOMATED COUNT: 13.4 % (ref 11.5–14.5)
GLUCOSE SERPL-MCNC: 104 MG/DL (ref 65–100)
HCT VFR BLD AUTO: 38.8 % (ref 35–47)
HGB BLD-MCNC: 13.7 G/DL (ref 11.5–16)
LYMPHOCYTES # BLD AUTO: 32 % (ref 12–49)
LYMPHOCYTES # BLD: 2.7 K/UL (ref 0.8–3.5)
MAGNESIUM SERPL-MCNC: 1.8 MG/DL (ref 1.6–2.4)
MCH RBC QN AUTO: 33.1 PG (ref 26–34)
MCHC RBC AUTO-ENTMCNC: 35.3 G/DL (ref 30–36.5)
MCV RBC AUTO: 93.7 FL (ref 80–99)
MONOCYTES # BLD: 0.8 K/UL (ref 0–1)
MONOCYTES NFR BLD AUTO: 9 % (ref 5–13)
NEUTS SEG # BLD: 4.6 K/UL (ref 1.8–8)
NEUTS SEG NFR BLD AUTO: 55 % (ref 32–75)
PLATELET # BLD AUTO: 477 K/UL (ref 150–400)
POTASSIUM SERPL-SCNC: 3.6 MMOL/L (ref 3.5–5.1)
RBC # BLD AUTO: 4.14 M/UL (ref 3.8–5.2)
SODIUM SERPL-SCNC: 133 MMOL/L (ref 136–145)
WBC # BLD AUTO: 8.3 K/UL (ref 3.6–11)

## 2017-05-22 PROCEDURE — 80048 BASIC METABOLIC PNL TOTAL CA: CPT | Performed by: PHYSICIAN ASSISTANT

## 2017-05-22 PROCEDURE — 36415 COLL VENOUS BLD VENIPUNCTURE: CPT | Performed by: PHYSICIAN ASSISTANT

## 2017-05-22 PROCEDURE — 74011250636 HC RX REV CODE- 250/636: Performed by: PHYSICIAN ASSISTANT

## 2017-05-22 PROCEDURE — 74011250636 HC RX REV CODE- 250/636: Performed by: SURGERY

## 2017-05-22 PROCEDURE — 83735 ASSAY OF MAGNESIUM: CPT | Performed by: PHYSICIAN ASSISTANT

## 2017-05-22 PROCEDURE — 65270000029 HC RM PRIVATE

## 2017-05-22 PROCEDURE — 97110 THERAPEUTIC EXERCISES: CPT

## 2017-05-22 PROCEDURE — 74011250637 HC RX REV CODE- 250/637: Performed by: SURGERY

## 2017-05-22 PROCEDURE — 85025 COMPLETE CBC W/AUTO DIFF WBC: CPT | Performed by: PHYSICIAN ASSISTANT

## 2017-05-22 PROCEDURE — 97535 SELF CARE MNGMENT TRAINING: CPT

## 2017-05-22 RX ADMIN — PANTOPRAZOLE SODIUM 40 MG: 40 TABLET, DELAYED RELEASE ORAL at 08:01

## 2017-05-22 RX ADMIN — ACETAMINOPHEN 650 MG: 325 TABLET ORAL at 08:01

## 2017-05-22 RX ADMIN — VENLAFAXINE HYDROCHLORIDE 150 MG: 150 CAPSULE, EXTENDED RELEASE ORAL at 08:01

## 2017-05-22 RX ADMIN — GABAPENTIN 600 MG: 300 CAPSULE ORAL at 08:01

## 2017-05-22 RX ADMIN — HYDROCHLOROTHIAZIDE 25 MG: 25 TABLET ORAL at 08:01

## 2017-05-22 RX ADMIN — LEVOTHYROXINE SODIUM 50 MCG: 0.05 TABLET ORAL at 08:01

## 2017-05-22 RX ADMIN — GABAPENTIN 600 MG: 300 CAPSULE ORAL at 17:17

## 2017-05-22 RX ADMIN — ENOXAPARIN SODIUM 40 MG: 40 INJECTION SUBCUTANEOUS at 08:01

## 2017-05-22 RX ADMIN — DEXTROSE MONOHYDRATE, SODIUM CHLORIDE, AND POTASSIUM CHLORIDE 75 ML/HR: 50; 4.5; 1.49 INJECTION, SOLUTION INTRAVENOUS at 14:27

## 2017-05-22 RX ADMIN — AMLODIPINE BESYLATE 5 MG: 5 TABLET ORAL at 08:01

## 2017-05-22 RX ADMIN — GABAPENTIN 600 MG: 300 CAPSULE ORAL at 21:52

## 2017-05-22 RX ADMIN — ACETAMINOPHEN 650 MG: 325 TABLET ORAL at 12:01

## 2017-05-22 RX ADMIN — ACETAMINOPHEN 650 MG: 325 TABLET ORAL at 17:17

## 2017-05-22 NOTE — CONSULTS
[address] [facility] at [address] [place] [address]  [phone]    Hematology / Oncology Consult    Reason for Visit:   Fahad Molina is a 69 y.o. female who is seen in consultation at the request of Porter Carrera  for evaluation of appendix carcinoid. History of Present Illness:   Ms Sierra Khoury was admitted on 5/12/2017 from the ED when she presented with 7 day abd pain migrating to [facility]. On travel returning home to [facility]. ED CT scan shows abnormal appendix. Therefore she was admitted for further eval and management. Ms Sierra Khoury underwent lap rt colectomy and mobilization hepatic flexure on 5/12/2017  Required PCA post surgery for pain management; POD #4 mobilizing well and tolerating diet  Had post surgical wound requiring packing  POD #6 had abd pain ; CT scan (5/17) suggested small anastomotic leak; observed and continued abx;     Path  FINAL PATHOLOGIC DIAGNOSIS   Appendix, terminal ileum and right colon, right hemicolectomy:   Appendiceal goblet cell carcinoid   Acute appendicitis     See comment and synoptic report   Synoptic report:   Specimen: Appendix, terminal ileum, right colon   Procedure: Right hemicolectomy   Tumor site: Distal half of the appendix   Tumor size: 1.1 cm   Histologic type: Goblet cell carcinoid   Histologic grade: Grade 1 of 4 grades   Tumor extent: Penetrates muscularis propria into mesoappendix   Margins: Uninvolved by goblet cell carcinoid   Distance of tumor to closest margin is 2.0 mm, mesentery   Lymphovascular invasion: Not identified   Tumor deposits: Not identified   Perineural invasion: Not identified   Lymph nodes: 12 lymph nodes negative for goblet cell carcinoid (0/12)   Pathologic stage (TMN):   Primary tumor: pT3   Regional lymph nodes: pN0   Distant metastases: pMX      We have been  consulted for appendiceal goblet  cell carcinoid and recommendations for additional labs or scans that should be performed to complete staging.      According to notes Ms Sierra Khoury will be returning to her home in [facility] within the week. Here visiting family when she got sick. Ms Sierra Khoury states feeling better; tolerating diet of clear liquids; passing gas and having BMs. Denies N/V. Aware that they found cancer in her appendix.  at bedside. Past Medical History:   Diagnosis Date    Hypertension     Thyroid disease       Past Surgical History:   Procedure Laterality Date    HX HYSTERECTOMY      HX ORTHOPAEDIC      mult sx on L ankle      Social History   Substance Use Topics    Smoking status: Former Smoker    Smokeless tobacco: Not on file    Alcohol use No      History reviewed. No pertinent family history.   Current Facility-Administered Medications   Medication Dose Route Frequency    diazePAM (VALIUM) injection 2.5 mg  2.5 mg IntraVENous Q6H PRN    ketorolac (TORADOL) injection 15 mg  15 mg IntraVENous Q6H PRN    dextrose 5% - 0.45% NaCl with KCl 20 mEq/L infusion  75 mL/hr IntraVENous CONTINUOUS    enoxaparin (LOVENOX) injection 40 mg  40 mg SubCUTAneous Q24H    lidocaine (LIDODERM) 5 % patch 1 Patch  1 Patch TransDERmal Q24H    oxyCODONE IR (ROXICODONE) tablet 2.5 mg  2.5 mg Oral Q4H PRN    oxyCODONE IR (ROXICODONE) tablet 5 mg  5 mg Oral Q4H PRN    HYDROmorphone (PF) (DILAUDID) injection 0.5 mg  0.5 mg IntraVENous Q4H PRN    ondansetron (ZOFRAN) injection 4 mg  4 mg IntraVENous Q6H PRN    amLODIPine (NORVASC) tablet 5 mg  5 mg Oral DAILY    pantoprazole (PROTONIX) tablet 40 mg  40 mg Oral ACB    gabapentin (NEURONTIN) capsule 600 mg  600 mg Oral TID    hydroCHLOROthiazide (HYDRODIURIL) tablet 25 mg  25 mg Oral DAILY    levothyroxine (SYNTHROID) tablet 50 mcg  50 mcg Oral ACB    acetaminophen (TYLENOL) tablet 650 mg  650 mg Oral TIDAC    venlafaxine-SR (EFFEXOR-XR) capsule 150 mg  150 mg Oral DAILY WITH BREAKFAST      No Known Allergies     Review of Systems: A complete review of systems was obtained, negative except as described above.      Physical Exam:     Visit Vitals    /63 (BP 1 Location: Left arm, BP Patient Position: Supine)    Pulse 73    Temp 97.8 °F (36.6 °C)    Resp 18    Ht 5' (1.524 m)    Wt 63.2 kg (139 lb 6.4 oz)    SpO2 94%    Breastfeeding No    BMI 27.22 kg/m2     ECOG PS:   General: No distress  Eyes: PERRLA, anicteric sclerae  HENT: Atraumatic, OP clear  Neck: Supple  Lymphatic: No cervical, supraclavicular, or inguinal adenopathy  Respiratory: CTAB, normal respiratory effort  CV: Normal rate, regular rhythm, no murmurs, no peripheral edema  GI: VALERIA drain noted; dsg to abd; Soft, nontender, nondistended, no masses, no hepatomegaly, no splenomegaly  Skin: No rashes, ecchymoses, or petechiae. Normal temperature, turgor, and texture. Psych: Alert, oriented, appropriate affect, normal judgment/insight  Neuro: CN II-XII intact    Results:     Lab Results   Component Value Date/Time    WBC 8.3 05/22/2017 05:38 AM    HGB 13.7 05/22/2017 05:38 AM    HCT 38.8 05/22/2017 05:38 AM    PLATELET 381 13/03/5891 05:38 AM    MCV 93.7 05/22/2017 05:38 AM    ABS. NEUTROPHILS 4.6 05/22/2017 05:38 AM     Lab Results   Component Value Date/Time    Sodium 133 05/22/2017 05:38 AM    Potassium 3.6 05/22/2017 05:38 AM    Chloride 98 05/22/2017 05:38 AM    CO2 25 05/22/2017 05:38 AM    Glucose 104 05/22/2017 05:38 AM    BUN 7 05/22/2017 05:38 AM    Creatinine 0.75 05/22/2017 05:38 AM    GFR est AA >60 05/22/2017 05:38 AM    GFR est non-AA >60 05/22/2017 05:38 AM    Calcium 9.2 05/22/2017 05:38 AM     Lab Results   Component Value Date/Time    Bilirubin, total 0.8 05/12/2017 03:13 PM    ALT (SGPT) 30 05/12/2017 03:13 PM    AST (SGOT) 29 05/12/2017 03:13 PM    Alk.  phosphatase 43 05/12/2017 03:13 PM    Protein, total 8.3 05/12/2017 03:13 PM    Albumin 3.5 05/12/2017 03:13 PM    Globulin 4.8 05/12/2017 03:13 PM     Lab Results   Component Value Date/Time    Lipase 145 05/12/2017 03:13 PM       5/12/2017 CT ABD PELV W CONT  IMPRESSION:  Abnormal appendix, which is dilated, thickened with mild surrounding  inflammatory stranding most consistent with appendicitis. However, there is  unusual nodular thickening at the base of the appendix as well as in the  proximal to mid appendiceal wall. Thus, cannot completely exclude a neoplastic  appendiceal process. 5/17/2017 CT ABD PELV W CONT  1. Patient is status post right colectomy. At the postoperative site medially,  there are a few extraluminal bubbles of gas and haziness in the mesentery which  could represent a leak from the anastomosis versus postoperative changes. .. No  drainable abscess is identified. Assessment and Recommendations:   1. Appendiceal goblet cell carcinoid [id number], stage IIA:    Her prognosis is excellent, her tumor is < 2 cm, margins are clear, no LVI. Her 5 year DFS is 76%. She underwent right hemicolectomy. I would not recommend any further scans. A baseline chromogranin A is reasonable. Discussed with patient and family. Her surgical care has been excellent. Thank you for this consult. All of the patient's questions were answered today.         Signed By: Estella Hoffmann NP     May 22, 2017

## 2017-05-22 NOTE — PROGRESS NOTES
Problem: Self Care Deficits Care Plan (Adult)  Goal: *Acute Goals and Plan of Care (Insert Text)  Occupational Therapy Goals  Initiated 5/16/2017  1. Patient will perform grooming standing at sink with modified independence within 7 day(s). 2. Patient will perform bathing with modified independence within 7 day(s). 3. Patient will perform lower body dressing with modified independence within 7 day(s). 4. Patient will perform toilet transfers with modified independence within 7 day(s). 5. Patient will perform all aspects of toileting with modified independence within 7 day(s). 6. Patient will participate in upper extremity therapeutic exercise/activities with independence for 10 minutes within 7 day(s). 7. Patient will utilize energy conservation techniques during functional activities with verbal and visual cues within 7 day(s). OCCUPATIONAL THERAPY TREATMENT  Patient: Michela Clay (22 y.o. female)  Date: 5/22/2017  Diagnosis: APPENDICITIS  Appendicitis <principal problem not specified>  Procedure(s) (LRB):  LAPAROSCOPIC TAKE DOWN OF SPLENIC FLEXURE  AND RIGHT COLECTOMY (N/A) 10 Days Post-Op  Precautions: Fall, Bed Alarm  Chart, occupational therapy assessment, plan of care, and goals were reviewed. ASSESSMENT:  Pt agreeable to OT. She ambulated to bathroom and completed toileting and hand washing with supervision. Supervision needed as pt is impulsive and is a fall risk. Educated pt as to not getting up from chair alone, pt has chair alarm. Pts O2 sats 82% on room air, heart rate 120 beats per minute after activity. Pt placed on 2 L O2 and sats increased to 93%. Pt participated with UE therapeutic exercises to increase strength and endurance for self care. Recommend home health.    Progression toward goals:  [X]          Improving appropriately and progressing toward goals  [ ]          Improving slowly and progressing toward goals  [ ]          Not making progress toward goals and plan of care will be adjusted       PLAN:  Patient continues to benefit from skilled intervention to address the above impairments. Continue treatment per established plan of care. Discharge Recommendations:  Home health  Further Equipment Recommendations for Discharge: None for OT       SUBJECTIVE:   Patient stated Carlota Aladdin I get up myself and go to bed? Brad Campbell      OBJECTIVE DATA SUMMARY:   Cognitive/Behavioral Status:  Neurologic State: Alert  Orientation Level: Oriented X4  Cognition: Impulsive;Memory loss           Functional Mobility and Transfers for ADLs:              Bed Mobility:      Mod I supine to sit. Transfers:     Functional Transfers  Toilet Transfer : Supervision     Balance: Intact sitting balance. ADL Intervention:        Grooming  Washing Hands: Supervision/set-up (pt is impulsive and a fall risk)      Toileting  Bladder Hygiene: Independent        Therapeutic Exercises:     EXERCISE   Sets   Reps   Active Active Assist   Passive   Comments   Shoulder flex/ext 2 10 [X]           [ ]           [ ]               Elbow flex/ext 2 10 [X]           [ ]           [ ]               Chest presses 2 10 [X]           [ ]           [ ]               Forearm supination/pronation 2 10 [X]           [ ]           [ ]               Wrist flex/ext 2 10 [X]           [ ]           [ ]               Finger flex/ext 2 10 [X]           [ ]           [ ]                     [ ]           [ ]           [ ]                     [ ]           [ ]           [ ]                     [ ]           [ ]           [ ]                     [ ]           [ ]           [ ]                     [ ]           [ ]           [ ]                        Pain:  Pain Scale 1: Numeric (0 - 10)  Pain Intensity 1: 3  Pain Location 1: Abdomen  Pain Orientation 1: Anterior  Pain Description 1: Other (comment) (spasm)        Activity Tolerance:    Please refer to the flowsheet for vital signs taken during this treatment.   After treatment:   [X] Patient left in no apparent distress sitting up in chair  [ ]  Patient left in no apparent distress in bed  [X]  Call bell left within reach  [ ]  Nursing notified  [ ]  Caregiver present  [X]  Chair alarm activated      COMMUNICATION/COLLABORATION:   The patients plan of care was discussed with: Occupational Therapist     RACHELE Jules  Time Calculation: 23 mins

## 2017-05-22 NOTE — PROGRESS NOTES
Nutrition Assessment:    RECOMMENDATIONS/INTERVENTION(S):   Diet advancement per Surgery    Recommend D/C Ensure Clear - pt does not like & no PO intake    ASSESSMENT:   5/22:  Diet advanced to CLD today. No plans for anastomotic leak surgery. Last BM 5/22. 10# wt loss in 1 wk - significant 6.7% decrease. Pt reports sipping only water over the weekend. Ensure Clear caused vomiting. Not agreeable to any ONS. Pt meets criteria for malnutrition. Labs/meds reviewed. Elevated BG. D5 IVF. Meets Criteria for Severe Acute Malnutrition as evidenced by:   [] Moderate muscle wasting, loss of subcutaneous fat   [x] Nutritional intake of <50% of recommended intake for >5 days   [x] Weight loss of >1-2% in 1 week, >5% in 1 month, >7.5% in 3 months, or >10% in 6 months   [] Moderate-severe edema        5/19:  Follow-up. Pt continues w/ abd pain & distention. Per surgery, no fever & bowel function continues -  no plans for intervention. Continue w/ sips of Ensure TID & IVF (D5 @ 75 ml/hr). Per pt, she has not been drinking the Ensure Clear. Has been sipping water only. Encouraged PO of Ensure Clear over water for some nutritional benefit - pt pleasant & agreeable. Inadequate nutrition x 5 days. Minimal nutrition x 2 days. 5/18:  Pt seen for LOS. 69 yof admitted for appendicitis. POD #6 lap right colectomy. Diet was advanced to Regular, low fiber x 3 days post-op w/ fair PO. Spoke w/ RN, abd distention started yesterday. S/p CT showing concern for anastomotic leak - pt made NPO last night. Close observation, ABX. D5 w/ KCl @ 75 infusing. MD ordered Ensure Clear TID - sips. Called to have Ensure Clear delivered to pt room. Visited pt. Pt denies wt loss or decreased PO PTA. Unable to state UBW but did say \"I love to eat - I want a filet mignon. \"  MD notes pt reports of abd pain x 7 days PTA. Overwt per BMI. No wt hx. No signs of muscle wasting or SQ fat loss per NFPA conducted.   No edema noted. Labs/meds reviewed. Lytes requiring repletion. SUBJECTIVE/OBJECTIVE:     Diet Order: Clear liquids, Ensure Clear TID  % Eaten:  No data found. Pertinent Medications: [x] Reviewed - protonix, zofran    Chemistries:  Lab Results   Component Value Date/Time    Sodium 133 05/22/2017 05:38 AM    Potassium 3.6 05/22/2017 05:38 AM    Chloride 98 05/22/2017 05:38 AM    CO2 25 05/22/2017 05:38 AM    Anion gap 10 05/22/2017 05:38 AM    Glucose 104 05/22/2017 05:38 AM    BUN 7 05/22/2017 05:38 AM    Creatinine 0.75 05/22/2017 05:38 AM    BUN/Creatinine ratio 9 05/22/2017 05:38 AM    GFR est AA >60 05/22/2017 05:38 AM    GFR est non-AA >60 05/22/2017 05:38 AM    Calcium 9.2 05/22/2017 05:38 AM    AST (SGOT) 29 05/12/2017 03:13 PM    Alk. phosphatase 43 05/12/2017 03:13 PM    Protein, total 8.3 05/12/2017 03:13 PM    Albumin 3.5 05/12/2017 03:13 PM    Globulin 4.8 05/12/2017 03:13 PM    A-G Ratio 0.7 05/12/2017 03:13 PM    ALT (SGPT) 30 05/12/2017 03:13 PM      Anthropometrics: Height: 5' (152.4 cm) Weight: 63.2 kg (139 lb 6.4 oz)    IBW (%IBW): 45.4 kg (100 lb) (149.91 %) UBW (%UBW):   (  %)    BMI: Body mass index is 27.22 kg/(m^2). This BMI is indicative of:   [] Underweight    [] Normal    [x] Overweight    []  Obesity    []  Extreme Obesity (BMI>40)  Estimated Nutrition Needs (Based on): 1418 Kcals/day (RMR (1091) x 1.3 AF) , 74 g (-88 (1.1-1.3 g/kg x actual body wt)) Protein  Carbohydrate:  At Least 130 g/day  Fluids: 1400 mL/day    Last BM: 5/22, abd tender   [x]Active     []Hyperactive  []Hypoactive       [] Absent   BS  Skin:    [] Intact   [x] Incision  [] Breakdown   [] DTI   [] Tears/Excoriation/Abrasion  []Edema [x] Other:VALERIA drain     Wt Readings from Last 30 Encounters:   05/22/17 63.2 kg (139 lb 6.4 oz)      NUTRITION DIAGNOSES:   Problem:  Inadequate oral food/beverage intake     Etiology: related to altered GI function      Signs/Symptoms: as evidenced by NPO & s/p lap right colectomy, concern for anastomotic leak      NUTRITION INTERVENTIONS:                General, healthful diet     GOAL:   Initiate PO diet w/ no s/s of intolerance in the next 1-3 days    Cultural, Advent, or Ethnic Dietary Needs: None     LEARNING NEEDS (Diet, Food/Nutrient-Drug Interaction):    [x] None Identified   [] Identified and Education Provided/Documented   [] Identified and Pt declined/was not appropriate      [] Interdisciplinary Care Plan Reviewed/Documented    [] Discharge Needs:    TBD   [] No Nutrition Related Discharge Needs    NUTRITION RISK:   Pt Is At Nutrition Risk  [x]     No Nutrition Risk Identified  []       PT SEEN FOR:    []  MD Consult: []Calorie Count      []Diabetic Diet Education        []Diet Education     []Electrolyte Management     []General Nutrition Management and Supplements     []Management of Tube Feeding     []TPN Recommendations    []  RN Referral:  []MST score >=2     []Enteral/Parenteral Nutrition PTA     []Pregnant: Gestational DM or Multigestation                 [] Pressure Ulcer    []  Low BMI      []  Length of Stay       [] Dysphagia Diet         [] Ventilator  [x]  Follow-up     Previous Recommendations:   [] Implemented    [x] Progressing Towards Goal            [] Not Implemented          [] Not Applicable    Previous Goal:   [] Met              [x] Progressing Towards Goal              [] Not Progressing Towards Goal   [] Not Applicable            Saurabh Ceja, [address] [place] [address]   Pager [phone]

## 2017-05-22 NOTE — PROGRESS NOTES
General Surgery Daily Progress Note    Patient: Prasad Kwok MRN: [id number]  SSN: [id number]    YOB: 1940  Age: 69 y.o. Sex: female      Admit Date: 5/12/2017    Subjective:   Pain unchanged, bowel function continues.  No fever    Current Facility-Administered Medications   Medication Dose Route Frequency    diazePAM (VALIUM) injection 2.5 mg  2.5 mg IntraVENous Q6H PRN    ketorolac (TORADOL) injection 15 mg  15 mg IntraVENous Q6H PRN    dextrose 5% - 0.45% NaCl with KCl 20 mEq/L infusion  75 mL/hr IntraVENous CONTINUOUS    enoxaparin (LOVENOX) injection 40 mg  40 mg SubCUTAneous Q24H    lidocaine (LIDODERM) 5 % patch 1 Patch  1 Patch TransDERmal Q24H    oxyCODONE IR (ROXICODONE) tablet 2.5 mg  2.5 mg Oral Q4H PRN    oxyCODONE IR (ROXICODONE) tablet 5 mg  5 mg Oral Q4H PRN    HYDROmorphone (PF) (DILAUDID) injection 0.5 mg  0.5 mg IntraVENous Q4H PRN    ondansetron (ZOFRAN) injection 4 mg  4 mg IntraVENous Q6H PRN    amLODIPine (NORVASC) tablet 5 mg  5 mg Oral DAILY    pantoprazole (PROTONIX) tablet 40 mg  40 mg Oral ACB    gabapentin (NEURONTIN) capsule 600 mg  600 mg Oral TID    hydroCHLOROthiazide (HYDRODIURIL) tablet 25 mg  25 mg Oral DAILY    levothyroxine (SYNTHROID) tablet 50 mcg  50 mcg Oral ACB    acetaminophen (TYLENOL) tablet 650 mg  650 mg Oral TIDAC    venlafaxine-SR (EFFEXOR-XR) capsule 150 mg  150 mg Oral DAILY WITH BREAKFAST        Objective:   05/22 0701 - 05/22 1900  In: -   Out: 10 [Drains:10]  05/20 1901 - 05/22 0700  In: 802.5 [I.V.:802.5]  Out: 36 [Urine:1; Drains:35]  Patient Vitals for the past 8 hrs:   BP Temp Pulse Resp SpO2 Weight   05/22/17 1109 140/66 97.9 °F (36.6 °C) 73 16 94 % -   05/22/17 0559 - - - - - 63.2 kg (139 lb 6.4 oz)   05/22/17 0525 134/68 98.6 °F (37 °C) 71 15 95 % -       Physical Exam:  General: Alert, cooperative, NAD  Lungs: Clear to auscultation bilaterally  Heart:  Regular rate and rhythm  Abdomen: Soft, ATTP, non-distended. + bowel sounds. Incisions c/d/o. VALERIA drain SS. Extremities: Warm, moves all, no edema  Skin:  Warm and dry, no rash    Labs:   Recent Labs      05/22/17   0538   WBC  8.3   HGB  13.7   HCT  38.8   PLT  477*     Recent Labs      05/22/17   0538   NA  133*   K  3.6   CL  98   CO2  25   GLU  104*   BUN  7   CREA  0.75   CA  9.2   MG  1.8       Assessment / Plan:   · 5/12 lap right colectomy  · CT findings 5/17 raise concern for small anastomotic leak but clinically she is well  · No indication of worsening leak over the last 5 days. We will restart clears, continued to monitor. · Continue LLQ wound packing  · OOB and ambulate  · No plan for discharge this weekend. · Pathology- goblet cell carcinoid [id number], negative margins. Oncology consult. Patient is from [facility] but will be staying in the [place] area during the immediate post-operative course.

## 2017-05-23 LAB
ANION GAP BLD CALC-SCNC: 10 MMOL/L (ref 5–15)
BASOPHILS # BLD AUTO: 0 K/UL (ref 0–0.1)
BASOPHILS # BLD: 0 % (ref 0–1)
BUN SERPL-MCNC: 6 MG/DL (ref 6–20)
BUN/CREAT SERPL: 8 (ref 12–20)
CALCIUM SERPL-MCNC: 9.2 MG/DL (ref 8.5–10.1)
CHLORIDE SERPL-SCNC: 100 MMOL/L (ref 97–108)
CO2 SERPL-SCNC: 26 MMOL/L (ref 21–32)
CREAT SERPL-MCNC: 0.78 MG/DL (ref 0.55–1.02)
EOSINOPHIL # BLD: 0.3 K/UL (ref 0–0.4)
EOSINOPHIL NFR BLD: 3 % (ref 0–7)
ERYTHROCYTE [DISTWIDTH] IN BLOOD BY AUTOMATED COUNT: 14.1 % (ref 11.5–14.5)
GLUCOSE SERPL-MCNC: 102 MG/DL (ref 65–100)
HCT VFR BLD AUTO: 38.7 % (ref 35–47)
HGB BLD-MCNC: 14 G/DL (ref 11.5–16)
LYMPHOCYTES # BLD AUTO: 37 % (ref 12–49)
LYMPHOCYTES # BLD: 3.2 K/UL (ref 0.8–3.5)
MAGNESIUM SERPL-MCNC: 1.7 MG/DL (ref 1.6–2.4)
MCH RBC QN AUTO: 33.2 PG (ref 26–34)
MCHC RBC AUTO-ENTMCNC: 36.2 G/DL (ref 30–36.5)
MCV RBC AUTO: 91.7 FL (ref 80–99)
MONOCYTES # BLD: 0.9 K/UL (ref 0–1)
MONOCYTES NFR BLD AUTO: 11 % (ref 5–13)
NEUTS SEG # BLD: 4.2 K/UL (ref 1.8–8)
NEUTS SEG NFR BLD AUTO: 49 % (ref 32–75)
PLATELET # BLD AUTO: 471 K/UL (ref 150–400)
POTASSIUM SERPL-SCNC: 3.9 MMOL/L (ref 3.5–5.1)
RBC # BLD AUTO: 4.22 M/UL (ref 3.8–5.2)
SODIUM SERPL-SCNC: 136 MMOL/L (ref 136–145)
WBC # BLD AUTO: 8.6 K/UL (ref 3.6–11)

## 2017-05-23 PROCEDURE — 80048 BASIC METABOLIC PNL TOTAL CA: CPT | Performed by: SURGERY

## 2017-05-23 PROCEDURE — 74011250636 HC RX REV CODE- 250/636: Performed by: SURGERY

## 2017-05-23 PROCEDURE — 65270000029 HC RM PRIVATE

## 2017-05-23 PROCEDURE — 86316 IMMUNOASSAY TUMOR OTHER: CPT | Performed by: SURGERY

## 2017-05-23 PROCEDURE — 83735 ASSAY OF MAGNESIUM: CPT | Performed by: SURGERY

## 2017-05-23 PROCEDURE — 74011250636 HC RX REV CODE- 250/636: Performed by: PHYSICIAN ASSISTANT

## 2017-05-23 PROCEDURE — 36415 COLL VENOUS BLD VENIPUNCTURE: CPT | Performed by: PHYSICIAN ASSISTANT

## 2017-05-23 PROCEDURE — 85025 COMPLETE CBC W/AUTO DIFF WBC: CPT | Performed by: PHYSICIAN ASSISTANT

## 2017-05-23 PROCEDURE — 97535 SELF CARE MNGMENT TRAINING: CPT

## 2017-05-23 PROCEDURE — 74011250637 HC RX REV CODE- 250/637: Performed by: SURGERY

## 2017-05-23 RX ADMIN — GABAPENTIN 600 MG: 300 CAPSULE ORAL at 09:20

## 2017-05-23 RX ADMIN — VENLAFAXINE HYDROCHLORIDE 150 MG: 150 CAPSULE, EXTENDED RELEASE ORAL at 09:20

## 2017-05-23 RX ADMIN — GABAPENTIN 600 MG: 300 CAPSULE ORAL at 16:20

## 2017-05-23 RX ADMIN — ACETAMINOPHEN 650 MG: 325 TABLET ORAL at 06:42

## 2017-05-23 RX ADMIN — ACETAMINOPHEN 650 MG: 325 TABLET ORAL at 12:09

## 2017-05-23 RX ADMIN — HYDROCHLOROTHIAZIDE 25 MG: 25 TABLET ORAL at 09:24

## 2017-05-23 RX ADMIN — ENOXAPARIN SODIUM 40 MG: 40 INJECTION SUBCUTANEOUS at 09:25

## 2017-05-23 RX ADMIN — AMLODIPINE BESYLATE 5 MG: 5 TABLET ORAL at 09:24

## 2017-05-23 RX ADMIN — LEVOTHYROXINE SODIUM 50 MCG: 0.05 TABLET ORAL at 06:42

## 2017-05-23 RX ADMIN — GABAPENTIN 600 MG: 300 CAPSULE ORAL at 22:18

## 2017-05-23 RX ADMIN — DIAZEPAM 2.5 MG: 5 INJECTION, SOLUTION INTRAMUSCULAR; INTRAVENOUS at 00:23

## 2017-05-23 RX ADMIN — DEXTROSE MONOHYDRATE, SODIUM CHLORIDE, AND POTASSIUM CHLORIDE 75 ML/HR: 50; 4.5; 1.49 INJECTION, SOLUTION INTRAVENOUS at 06:42

## 2017-05-23 RX ADMIN — PANTOPRAZOLE SODIUM 40 MG: 40 TABLET, DELAYED RELEASE ORAL at 06:42

## 2017-05-23 RX ADMIN — ACETAMINOPHEN 650 MG: 325 TABLET ORAL at 16:20

## 2017-05-23 NOTE — PROGRESS NOTES
Bedside and Verbal shift change report given to [facility] (oncoming nurse) by [facility] (offgoing nurse). Report included the following information SBAR, Kardex, Procedure Summary, Intake/Output, MAR and Recent Results.

## 2017-05-23 NOTE — PROGRESS NOTES
Physical Therapy:  Chart reviewed, spoke with RN. Patient requested to rest for a few hours at 2pm so treatment deferred at this time. Will continue to follow and check back as able. Thank you.   Emory Soler, SPT

## 2017-05-23 NOTE — PROGRESS NOTES
Bedside and Verbal shift change report given to Randolph Merino RN (oncoming nurse) by Kamila Angulo RN (offgoing nurse). Report included the following information SBAR, Kardex, Procedure Summary, Intake/Output, MAR, Accordion and Recent Results.

## 2017-05-23 NOTE — PROGRESS NOTES
Problem: Self Care Deficits Care Plan (Adult)  Goal: *Acute Goals and Plan of Care (Insert Text)  Occupational Therapy Goals  Weekly Reassessment 5/23/2017  1. Patient will perform grooming standing at sink x 5 mins with modified independence within 7 day(s). 2. Patient will perform bathing with modified independence within 7 day(s). 3. Patient will perform toilet transfers with modified independence within 7 day(s). 4. Patient will perform all aspects of toileting with modified independence within 7 day(s). 5. Patient will participate in upper extremity therapeutic exercise/activities with independence for 10 minutes within 7 day(s). 6. Patient will utilize energy conservation techniques during functional activities with verbal and visual cues within 7 day(s). Initiated 5/16/2017  1. Patient will perform grooming standing at sink with modified independence within 7 day(s). CONT  2. Patient will perform bathing with modified independence within 7 day(s). CONT  3. Patient will perform lower body dressing with modified independence within 7 day(s). CONT  4. Patient will perform toilet transfers with modified independence within 7 day(s). CONT  5. Patient will perform all aspects of toileting with modified independence within 7 day(s). CONT  6. Patient will participate in upper extremity therapeutic exercise/activities with independence for 10 minutes within 7 day(s). CONT  7. Patient will utilize energy conservation techniques during functional activities with verbal and visual cues within 7 day(s). CONT     OCCUPATIONAL THERAPY TREATMENT: WEEKLY REASSESSMENT  Patient: Som Thomson (38 y.o. female)  Date: 5/23/2017  Diagnosis: APPENDICITIS  Appendicitis <principal problem not specified>  Procedure(s) (LRB):  LAPAROSCOPIC TAKE DOWN OF SPLENIC FLEXURE  AND RIGHT COLECTOMY (N/A) 11 Days Post-Op  Precautions: Fall, Bed Alarm      ASSESSMENT:  Nursing cleared patient for therapy.   Patient pleasant and motivated for therapy.  present and supportive. Extensive education on energy conservation with focus on pacing secondary to decrease in SpO2 and increased HR in prev OT session. She reports she has always moves quickly. Completed toileting tasks with supervision without AD and standing at sink with supervision to complete hand hygiene. SpO2 on RA  Rest: 96%, HR 75  On Toilet: 92%, HR 98  After toileting, sitting EOB: 87%, HR 90. Approx 2 mins return to 93%. Educated on PLB techniques and pacing with fair understanding. Patient with mild increase in indep with ADL tasks however demonstrated decrease in SpO2 with toileting and hygiene tasks. Goals have been continued. Anticipate continued progression with therapy and recovery from surgery. Patient and  plan to return to [facility] after hospitalization. Denies any concerns with ADL tasks at this time.  reports ability to assist as needed. Progression toward goals:  [X]            Improving appropriately and progressing toward goals  [ ]            Improving slowly and progressing toward goals  [ ]            Not making progress toward goals and plan of care will be adjusted       PLAN:  Goals have been updated based on progression since last assessment. Patient continues to benefit from skilled intervention to address the above impairments. Continue to follow patient 5 times a week to address goals.   Planned Interventions:  [X]                    Self Care Training                  [X]             Therapeutic Activities  [X]                    Functional Mobility Training    [ ]             Cognitive Retraining  [X]                    Therapeutic Exercises           [X]             Endurance Activities  [X]                    Balance Training                   [ ]             Neuromuscular Re-Education  [ ]                    Visual/Perceptual Training     [X]        Home Safety Training  [X]                    Patient Education [X]             Family Training/Education  [ ]                    Other (comment):  Discharge Recommendations: None, anticipate continued progression  Further Equipment Recommendations for Discharge: none at this time       SUBJECTIVE:   Patient stated I don't really do well going slow.       OBJECTIVE DATA SUMMARY:   Cognitive/Behavioral Status:  Neurologic State: Alert  Orientation Level: Oriented X4  Cognition: Appropriate decision making; Appropriate for age attention/concentration; Follows commands              Functional Mobility and Transfers for ADLs:  Bed Mobility:  Rolling: Independent  Supine to Sit: Independent  Sit to Supine: Independent     Transfers:  Sit to Stand: Stand-by asssistance  Functional Transfers  Bathroom Mobility: Supervision/set up     Balance:  Sitting: Intact  Standing - Static: Good  Standing - Dynamic : Good     ADL Intervention:  Provided education through verbal ed on energy conservation with focus on pacing during ADL tasks. She completed toileting and grooming in standing. Decrease in Spo2 with this activity. Ed on PLB with ADL tasks during and after tasks. Needing cuing to slow pace through out. Demonstrates ability to complete toileting and grooming at supervision, mild impulsivity.  reports this is her baseline.      Toileting  Toileting Assistance: Supervision/set up  Bladder Hygiene: Independent  Clothing Management: Supervision/set-up     Pain:  Pain Scale 1: Numeric (0 - 10)  Pain Intensity 1: 0        Activity Tolerance:   See assessment for SpO2 and HR     After treatment:   [X] Patient left in no apparent distress sitting up in chair  [ ] Patient left in no apparent distress in bed  [X] Call bell left within reach  [X] Nursing notified  [X] Caregiver present  [X] Bed alarm activated      COMMUNICATION/COLLABORATION:   The patients plan of care was discussed with: Physical Therapist, Registered Nurse and Patient        Fabiola Lane OTR/L  Time Calculation: 26 mins

## 2017-05-23 NOTE — CDMP QUERY
Please clarify if this patient is being treated/managed for:    =>Severe Acute Malnutrition as evidenced by decreased caloric intake, and weight loss; RD consult; Ensure Clear supplements  =>Other Explanation of clinical findings  =>Unable to Determine (no explanation of clinical findings)    The medical record reflects the following clinical findings, treatment, and risk factors:    Risk Factors: Recent hemicolectomy for Appendiceal goblet cell carcinoid [id number]    Clinical Indicators: 5.22 RD consult states: \" Meets Criteria for Severe Acute Malnutrition as evidenced by\":  1. Nutritional intake of <50% of recommended intake for >5 days  2. Weight loss of >1-2% in 1 week, >5% in 1 month, >7.5% in 3 months, or >10% in 6 months  3. Last BM 5/22. 10# wt loss in 1 wk - significant 6.7% decrease. Pt reports sipping only water over the weekend. Ensure Clear caused vomiting. Not agreeable to any ONS. Pt meets criteria for malnutrition. Treatment: RD consult; monitor labs, intake and weight    Please clarify and document your clinical opinion in the progress notes and discharge summary including the definitive and/or presumptive diagnosis, (suspected or probable), related to the above clinical findings. Please include clinical findings supporting your diagnosis.     Thank you,  Renata Magdaleno, MSN, [facility] [place] [address]

## 2017-05-23 NOTE — PROGRESS NOTES
General Surgery Daily Progress Note    Patient: Geraldo Kirk MRN: [id number]  SSN: [id number]    YOB: 1940  Age: 69 y.o. Sex: female      Admit Date: 5/12/2017    Subjective:   Pain unchanged, bowel function continues. No fever    Current Facility-Administered Medications   Medication Dose Route Frequency    diazePAM (VALIUM) injection 2.5 mg  2.5 mg IntraVENous Q6H PRN    dextrose 5% - 0.45% NaCl with KCl 20 mEq/L infusion  75 mL/hr IntraVENous CONTINUOUS    enoxaparin (LOVENOX) injection 40 mg  40 mg SubCUTAneous Q24H    lidocaine (LIDODERM) 5 % patch 1 Patch  1 Patch TransDERmal Q24H    oxyCODONE IR (ROXICODONE) tablet 2.5 mg  2.5 mg Oral Q4H PRN    oxyCODONE IR (ROXICODONE) tablet 5 mg  5 mg Oral Q4H PRN    HYDROmorphone (PF) (DILAUDID) injection 0.5 mg  0.5 mg IntraVENous Q4H PRN    ondansetron (ZOFRAN) injection 4 mg  4 mg IntraVENous Q6H PRN    amLODIPine (NORVASC) tablet 5 mg  5 mg Oral DAILY    pantoprazole (PROTONIX) tablet 40 mg  40 mg Oral ACB    gabapentin (NEURONTIN) capsule 600 mg  600 mg Oral TID    hydroCHLOROthiazide (HYDRODIURIL) tablet 25 mg  25 mg Oral DAILY    levothyroxine (SYNTHROID) tablet 50 mcg  50 mcg Oral ACB    acetaminophen (TYLENOL) tablet 650 mg  650 mg Oral TIDAC    venlafaxine-SR (EFFEXOR-XR) capsule 150 mg  150 mg Oral DAILY WITH BREAKFAST        Objective:      05/21 1901 - 05/23 0700  In: 2675 [I.V.:2675]  Out: 20 [Drains:20]  Patient Vitals for the past 8 hrs:   BP Temp Pulse Resp SpO2 Weight   05/23/17 0921 140/69 97.7 °F (36.5 °C) 77 14 96 % -   05/23/17 0805 - - - - - 63 kg (139 lb)       Physical Exam:  General: Alert, cooperative, NAD  Lungs: Clear to auscultation bilaterally  Heart:  Regular rate and rhythm  Abdomen: Soft, ATTP, non-distended. + bowel sounds. Incisions c/d/i. LLQ wound packed, scant purulent drainage. VALERIA drain SS.    Extremities: Warm, moves all, no edema  Skin:  Warm and dry, no rash    Labs:   Recent Labs      05/23/17 0014   WBC  8.6   HGB  14.0   HCT  38.7   PLT  471*     Recent Labs      05/23/17   0630   NA  136   K  3.9   CL  100   CO2  26   GLU  102*   BUN  6   CREA  0.78   CA  9.2   MG  1.7       Assessment / Plan:   · 5/12 lap right colectomy  · CT findings 5/17 raise concern for small anastomotic leak but clinically she is well  · Advance diet  · VALERIA drain output minimal. Will remove at discharge. · Continue wound care  · Appreciate oncology evaluation  · Plan for discharge tomorrow if she is tolerating diet.

## 2017-05-24 ENCOUNTER — APPOINTMENT (OUTPATIENT)
Dept: GENERAL RADIOLOGY | Age: 77
DRG: 330 | End: 2017-05-24
Attending: PHYSICIAN ASSISTANT
Payer: MEDICARE

## 2017-05-24 LAB
BASOPHILS # BLD AUTO: 0 K/UL (ref 0–0.1)
BASOPHILS # BLD: 0 % (ref 0–1)
EOSINOPHIL # BLD: 0.3 K/UL (ref 0–0.4)
EOSINOPHIL NFR BLD: 4 % (ref 0–7)
ERYTHROCYTE [DISTWIDTH] IN BLOOD BY AUTOMATED COUNT: 13.6 % (ref 11.5–14.5)
HCT VFR BLD AUTO: 40.6 % (ref 35–47)
HGB BLD-MCNC: 13.8 G/DL (ref 11.5–16)
LYMPHOCYTES # BLD AUTO: 33 % (ref 12–49)
LYMPHOCYTES # BLD: 2.2 K/UL (ref 0.8–3.5)
MCH RBC QN AUTO: 32.3 PG (ref 26–34)
MCHC RBC AUTO-ENTMCNC: 34 G/DL (ref 30–36.5)
MCV RBC AUTO: 95.1 FL (ref 80–99)
MONOCYTES # BLD: 0.8 K/UL (ref 0–1)
MONOCYTES NFR BLD AUTO: 12 % (ref 5–13)
NEUTS SEG # BLD: 3.4 K/UL (ref 1.8–8)
NEUTS SEG NFR BLD AUTO: 51 % (ref 32–75)
PLATELET # BLD AUTO: 474 K/UL (ref 150–400)
RBC # BLD AUTO: 4.27 M/UL (ref 3.8–5.2)
WBC # BLD AUTO: 6.7 K/UL (ref 3.6–11)

## 2017-05-24 PROCEDURE — 71010 XR CHEST PORT: CPT

## 2017-05-24 PROCEDURE — 85025 COMPLETE CBC W/AUTO DIFF WBC: CPT | Performed by: PHYSICIAN ASSISTANT

## 2017-05-24 PROCEDURE — 97535 SELF CARE MNGMENT TRAINING: CPT

## 2017-05-24 PROCEDURE — 74011250636 HC RX REV CODE- 250/636: Performed by: PHYSICIAN ASSISTANT

## 2017-05-24 PROCEDURE — 74011250637 HC RX REV CODE- 250/637: Performed by: SURGERY

## 2017-05-24 PROCEDURE — 65270000029 HC RM PRIVATE

## 2017-05-24 PROCEDURE — 97530 THERAPEUTIC ACTIVITIES: CPT

## 2017-05-24 PROCEDURE — 36415 COLL VENOUS BLD VENIPUNCTURE: CPT | Performed by: PHYSICIAN ASSISTANT

## 2017-05-24 PROCEDURE — 97116 GAIT TRAINING THERAPY: CPT

## 2017-05-24 RX ORDER — AMOXICILLIN AND CLAVULANATE POTASSIUM 875; 125 MG/1; MG/1
1 TABLET, FILM COATED ORAL EVERY 12 HOURS
Qty: 14 TAB | Refills: 0 | Status: SHIPPED | OUTPATIENT
Start: 2017-05-24

## 2017-05-24 RX ADMIN — ENOXAPARIN SODIUM 40 MG: 40 INJECTION SUBCUTANEOUS at 08:51

## 2017-05-24 RX ADMIN — LEVOTHYROXINE SODIUM 50 MCG: 0.05 TABLET ORAL at 06:31

## 2017-05-24 RX ADMIN — AMLODIPINE BESYLATE 5 MG: 5 TABLET ORAL at 08:52

## 2017-05-24 RX ADMIN — VENLAFAXINE HYDROCHLORIDE 150 MG: 150 CAPSULE, EXTENDED RELEASE ORAL at 08:51

## 2017-05-24 RX ADMIN — GABAPENTIN 600 MG: 300 CAPSULE ORAL at 20:41

## 2017-05-24 RX ADMIN — ACETAMINOPHEN 650 MG: 325 TABLET ORAL at 11:50

## 2017-05-24 RX ADMIN — ACETAMINOPHEN 650 MG: 325 TABLET ORAL at 17:18

## 2017-05-24 RX ADMIN — HYDROCHLOROTHIAZIDE 25 MG: 25 TABLET ORAL at 08:52

## 2017-05-24 RX ADMIN — PANTOPRAZOLE SODIUM 40 MG: 40 TABLET, DELAYED RELEASE ORAL at 06:31

## 2017-05-24 RX ADMIN — GABAPENTIN 600 MG: 300 CAPSULE ORAL at 08:51

## 2017-05-24 RX ADMIN — ACETAMINOPHEN 650 MG: 325 TABLET ORAL at 06:31

## 2017-05-24 RX ADMIN — GABAPENTIN 600 MG: 300 CAPSULE ORAL at 17:18

## 2017-05-24 NOTE — PROGRESS NOTES
Problem: Self Care Deficits Care Plan (Adult)  Goal: *Acute Goals and Plan of Care (Insert Text)  Occupational Therapy Goals  Weekly Reassessment 5/23/2017  1. Patient will perform grooming standing at sink x 5 mins with modified independence within 7 day(s). 2. Patient will perform bathing with modified independence within 7 day(s). 3. Patient will perform toilet transfers with modified independence within 7 day(s). 4. Patient will perform all aspects of toileting with modified independence within 7 day(s). 5. Patient will participate in upper extremity therapeutic exercise/activities with independence for 10 minutes within 7 day(s). 6. Patient will utilize energy conservation techniques during functional activities with verbal and visual cues within 7 day(s). Initiated 5/16/2017  1. Patient will perform grooming standing at sink with modified independence within 7 day(s). CONT  2. Patient will perform bathing with modified independence within 7 day(s). CONT  3. Patient will perform lower body dressing with modified independence within 7 day(s). CONT  4. Patient will perform toilet transfers with modified independence within 7 day(s). CONT  5. Patient will perform all aspects of toileting with modified independence within 7 day(s). CONT  6. Patient will participate in upper extremity therapeutic exercise/activities with independence for 10 minutes within 7 day(s). CONT  7. Patient will utilize energy conservation techniques during functional activities with verbal and visual cues within 7 day(s). CONT     OCCUPATIONAL THERAPY TREATMENT  Patient: Pancho Cruz (63 y.o. female)  Date: 5/24/2017  Diagnosis: APPENDICITIS  Appendicitis <principal problem not specified>  Procedure(s) (LRB):  LAPAROSCOPIC TAKE DOWN OF SPLENIC FLEXURE  AND RIGHT COLECTOMY (N/A) 12 Days Post-Op  Precautions: Fall, Bed Alarm  Chart, occupational therapy assessment, plan of care, and goals were reviewed.       ASSESSMENT:  Pts ambulated to bathroom and transferred to [facility] with mod I. She moves quickly and is impulsive with O2 sats decreasing on room air to 84%. Pt returned to chair and after O2 applied sats increased quickly to 92%. She was able to [facility] gowns and don her own nightgown and robe with mod I. Recommend home health. Progression toward goals:  [X]          Improving appropriately and progressing toward goals  [ ]          Improving slowly and progressing toward goals  [ ]          Not making progress toward goals and plan of care will be adjusted       PLAN:  Patient continues to benefit from skilled intervention to address the above impairments. Continue treatment per established plan of care. Discharge Recommendations:  Home health  Further Equipment Recommendations for Discharge:  None for OT       SUBJECTIVE:   Patient stated I just woke up.       OBJECTIVE DATA SUMMARY:   Cognitive/Behavioral Status:  Neurologic State: Alert  Orientation Level: Oriented X4  Cognition: Follows commands; Impulsive           Functional Mobility and Transfers for ADLs:              Bed Mobility:  Rolling: Independent  Supine to Sit: Independent  Sit to Supine: Independent                   Transfers:  Sit to Stand: Independent  Functional Transfers  Toilet Transfer : Modified independent     Balance:  Sitting: Intact  Standing: Intact  ADL Intervention:                          Upper Body Dressing Assistance  Dressing Assistance: Modified independent  Pullover Shirt: Modified independent  Front Opened Shirt: Modified independent           Toileting  Bladder Hygiene: Independent  Clothing Management: Modified independent           Pain:  Pain Scale 1: Numeric (0 - 10)  Pain Intensity 1: 0                 Activity Tolerance:    Fair  Please refer to the flowsheet for vital signs taken during this treatment.   After treatment:   [X]  Patient left in no apparent distress sitting up in chair  [ ]  Patient left in no apparent distress in bed  [X]  Call bell left within reach  [ ]  Nursing notified  [X]  Caregiver present  [ ]  Bed alarm activated      COMMUNICATION/COLLABORATION:   The patients plan of care was discussed with: Occupational Therapist     ISMA Page/L  Time Calculation: 15 mins

## 2017-05-24 NOTE — PROGRESS NOTES
Bedside and Verbal shift change report given to Sangeeta Luna RN (oncoming nurse) by Alivia Garrido RN (offgoing nurse). Report included the following information SBAR, Kardex, Procedure Summary, Intake/Output, MAR, Accordion and Recent Results.

## 2017-05-24 NOTE — CONSULTS
[facility]  Pulmonary, Critical Care, and Sleep Medicine    Initial Patient Consult    Name: Som Thomson MRN: [id number]   : 1940 [facility]: [facility]   Date: 2017        IMPRESSION:   · Exertional hypoxia, suspect chronic  · Pulmonary fibrosis, unclear etiology at this point  · History of smoking      RECOMMENDATIONS:   · Overnight oximetry  · Outpt pulmonary evaluation: High res chest CT, PFTs, serologic screen for collagen vascular disease  · Arrange for home nocturnal O2 if needed  - will likely need to coordinate with PCP in [facility], incentive melina      recs d/w Dr. Gabby Velazquez - OK to d/c home once follow up plan clarified with her PCP     Subjective: This patient has been seen and evaluated at the request of Dr. Gabby Velazquez for hypoxia. Patient is a 69 y.o. female who is POD12 s/p lap right colectomy. She has been OOB & walking post op. She has been on pharmacologic DVT prophylaxis. She reports that her breathing is at baseline. She walked with PT in preparation for discharge & was ntoed to desat to 86% on RA with exertion. Her CXR today is notable for bilateral interstitial opacities. The lung windows on her abd CT also show subpleural linear interstitial opacities, without honeycombing. At baseline she gets SOB with heavy housework - mopping, vacuuming,even folding laundry. She does not cough or wheeze. She has not had pneumonia since she was a child. She smoked for a few years in her 35s and again for a few years in her 63s - a \"closet smoker\" never more than 1/2 ppd. Her parents were heavy smokers. She denies any occupational exposures. 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 She has osteoarthritis. There is no FH of lung disease.                 Past Medical History:   Diagnosis Date    Hypertension     Thyroid disease       Past Surgical History:   Procedure Laterality Date    HX HYSTERECTOMY      HX ORTHOPAEDIC      mult sx on L ankle      Prior to Admission medications    Medication Sig Start Date End Date Taking? Authorizing Provider   amoxicillin-clavulanate (AUGMENTIN) 875-125 mg per tablet Take 1 Tab by mouth every twelve (12) hours. 5/24/17  Yes YOSEF Duarte   acetaminophen (TYLENOL) 325 mg tablet Take 2 Tabs by mouth Before breakfast, lunch, and dinner. 5/16/17  Yes YOSEF Duarte   oxyCODONE IR (ROXICODONE) 5 mg immediate release tablet Take 0.5-1 Tabs by mouth every four (4) hours as needed. Max Daily Amount: 30 mg. 5/16/17  Yes YOSEF Duarte   esomeprazole (NEXIUM) 40 mg capsule Take 40 mg by mouth daily. Yes Luciana Tan MD   hydroCHLOROthiazide (HYDRODIURIL) 25 mg tablet Take 25 mg by mouth daily. Yes Luciana Tan MD   levothyroxine (SYNTHROID) 50 mcg tablet Take 50 mcg by mouth Daily (before breakfast). Yes Luciana Tan MD   Fenofibrate 150 mg cap Take 1 Cap by mouth nightly. Yes Luciana Tan MD   amLODIPine (NORVASC) 5 mg tablet Take 5 mg by mouth daily. Yes Luciana Tan MD   gabapentin (NEURONTIN) 300 mg capsule Take 600 mg by mouth three (3) times daily. Yes Luciana Tan MD   Venlafaxine 150 mg tr24 Take 150 mg by mouth daily. Yes Historical Provider   cyanocobalamin (VITAMIN B-12) 500 mcg tablet Take 500 mcg by mouth daily. Yes Historical Provider     No Known Allergies   Social History   Substance Use Topics    Smoking status: Former Smoker    Smokeless tobacco: Not on file    Alcohol use No      History reviewed. No pertinent family history.      Current Facility-Administered Medications   Medication Dose Route Frequency    enoxaparin (LOVENOX) injection 40 mg  40 mg SubCUTAneous Q24H    lidocaine (LIDODERM) 5 % patch 1 Patch  1 Patch TransDERmal Q24H    amLODIPine (NORVASC) tablet 5 mg  5 mg Oral DAILY    pantoprazole (PROTONIX) tablet 40 mg  40 mg Oral ACB    gabapentin (NEURONTIN) capsule 600 mg  600 mg Oral TID    hydroCHLOROthiazide (HYDRODIURIL) tablet 25 mg  25 mg Oral DAILY    levothyroxine (SYNTHROID) tablet 50 mcg  50 mcg Oral ACB    acetaminophen (TYLENOL) tablet 650 mg  650 mg Oral TIDAC    venlafaxine-SR (EFFEXOR-XR) capsule 150 mg  150 mg Oral DAILY WITH BREAKFAST       Review of Systems:  A comprehensive review of systems was negative except for that written in the HPI. Objective:   Vital Signs:    Visit Vitals    /72 (BP 1 Location: Left arm, BP Patient Position: At rest)    Pulse 75    Temp 97.7 °F (36.5 °C)    Resp 17    Ht 5' (1.524 m)    Wt 63.5 kg (139 lb 15.9 oz)    SpO2 93%    Breastfeeding No    BMI 27.34 kg/m2       O2 Device: Room air   O2 Flow Rate (L/min): 4 l/min   Temp (24hrs), Av.8 °F (36.6 °C), Min:97.6 °F (36.4 °C), Max:98.2 °F (36.8 °C)       Intake/Output:   Last shift:       07 - 1900  In: 480 [P.O.:480]  Out: -   Last 3 shifts:  190 -  0700  In: 1248.8 [P.O.:350; I.V.:898.8]  Out: 15 [Drains:15]    Intake/Output Summary (Last 24 hours) at 17 1712  Last data filed at 17 0851   Gross per 24 hour   Intake          1378.75 ml   Output                0 ml   Net          1378.75 ml      Physical Exam:   General:  Alert, cooperative, no distress, appears stated age. Head:  Normocephalic, without obvious abnormality, atraumatic. Eyes:  Conjunctivae/corneas clear. PERRL, EOMs intact. Nose: Nares normal. Septum midline. Mucosa normal. No drainage or sinus tenderness. Throat: Lips, mucosa, and tongue normal. No teeth. Wears dentures   Neck: Supple, symmetrical, trachea midline, no adenopathy, thyroid: no enlargment/tenderness/nodules, no carotid bruit and no JVD. Back:   Symmetric, no curvature. ROM normal.   Lungs: Inspiratory crackles anteriorly, bibasilar inspiratory crackles left > right, no wheeze   Chest wall:  No tenderness or deformity. Heart:  Regular rate and rhythm, S1, S2 normal, + systolic murmur   Abdomen:   Soft, non-tender. Bowel sounds normal. No masses,  No organomegaly. VALERIA drain with serous output   Extremities: Extremities normal, atraumatic, no cyanosis or edema. + Clubbing   Pulses: 2+ and symmetric all extremities. Skin: Skin color, texture, turgor normal. No rashes or lesions   Lymph nodes: Cervical, supraclavicular, and axillary nodes normal.   Neurologic: Grossly nonfocal     Data review:     Recent Results (from the past 24 hour(s))   CBC WITH AUTOMATED DIFF    Collection Time: 05/24/17  3:02 AM   Result Value Ref Range    WBC 6.7 3.6 - 11.0 K/uL    RBC 4.27 3.80 - 5.20 M/uL    HGB 13.8 11.5 - 16.0 g/dL    HCT 40.6 35.0 - 47.0 %    MCV 95.1 80.0 - 99.0 FL    MCH 32.3 26.0 - 34.0 PG    MCHC 34.0 30.0 - 36.5 g/dL    RDW 13.6 11.5 - 14.5 %    PLATELET 695 (H) 518 - 400 K/uL    NEUTROPHILS 51 32 - 75 %    LYMPHOCYTES 33 12 - 49 %    MONOCYTES 12 5 - 13 %    EOSINOPHILS 4 0 - 7 %    BASOPHILS 0 0 - 1 %    ABS. NEUTROPHILS 3.4 1.8 - 8.0 K/UL    ABS. LYMPHOCYTES 2.2 0.8 - 3.5 K/UL    ABS. MONOCYTES 0.8 0.0 - 1.0 K/UL    ABS. EOSINOPHILS 0.3 0.0 - 0.4 K/UL    ABS.  BASOPHILS 0.0 0.0 - 0.1 K/UL       Imaging:  I have personally reviewed the patients radiographs and have reviewed the reports:  As noted in HPI        Candelaria Mason MD

## 2017-05-24 NOTE — PROGRESS NOTES
Bedside and Verbal shift change report given to Izabela Moore (oncoming nurse) by [facility] (offgoing nurse). Report included the following information SBAR, Kardex, ED Summary, Intake/Output, MAR and Recent Results.

## 2017-05-24 NOTE — PROGRESS NOTES
Problem: Mobility Impaired (Adult and Pediatric)  Goal: *Acute Goals and Plan of Care (Insert Text)  Physical Therapy Goals  Initiated 5/15/2017  1. Patient will move from supine to sit and sit to supine , scoot up and down and roll side to side in bed with independence within 7 day(s). 2. Patient will transfer from bed to chair and chair to bed with modified independence using the least restrictive device within 7 day(s). 3. Patient will perform sit to stand with modified independence within 7 day(s). 4. Patient will ambulate with modified independence for 300 feet with the least restrictive device within 7 day(s). 5. Patient will ascend/descend 5 stairs with one handrail(s) with minimal assistance/contact guard assist within 7 day(s). PHYSICAL THERAPY TREATMENT: WEEKLY REASSESSMENT  Patient: Frida Mooney (81 y.o. female)  Date: 5/24/2017  Diagnosis: APPENDICITIS  Appendicitis <principal problem not specified>  Procedure(s) (LRB):  LAPAROSCOPIC TAKE DOWN OF SPLENIC FLEXURE  AND RIGHT COLECTOMY (N/A) 12 Days Post-Op  Precautions: Fall, Bed Alarm      ASSESSMENT:  Patient with decreased O2 saturation on room air with activity. Patient unable to recover to >90% with seated rest break and pursed lip breathing. Patient will benefit from supplemental O2 at discharge. Patient is MOD I for all mobility, she was able to negotiate 4 steps with 1 hand rail. No loss of balance or instability. Documentation for home O2:     ROOM AIR     AT REST    O2 SATS  95% HR  80   ROOM AIR WITH ACTIVITY 02 SATS  86% HR  90   (2    ) LITERS OF O2 WITH ACTIVITY O2 SATS  97% HR  91   (2    )LITERS OF 02 PATIENT LEFT COMFORTABLY  SITTING/SUPINE 02 SATS  96% HR  89            Patient's progression toward goals since last assessment: Patient since the last assessment has increased medical complexity due to high pain levels, anastomosis leak and still requiring a VALERIA drain.   Patient initially presented on 5/12/17, underwent a lab colectomy secondary to appendicitis. Patient has progressed with ambulation distance and assistance in the last week. She continues to benefit from home health at discharge for pacing strategies, O2 tubing management, and strengthing and balance training. PLAN:  Goals have been updated based on progression since last assessment. Patient continues to benefit from skilled intervention to address the above impairments. Continue to follow the patient 5 times a week to address goals. Planned Interventions:  [X]              Bed Mobility Training             [X]       Neuromuscular Re-Education  [X]              Transfer Training                   [ ]       Orthotic/Prosthetic Training  [X]              Gait Training                         [ ]       Modalities  [X]              Therapeutic Exercises           [ ]       Edema Management/Control  [X]              Therapeutic Activities            [X]       Patient and Family Training/Education  [ ]              Other (comment):  Discharge Recommendations: Home Health  Further Equipment Recommendations for Discharge: supplemental O2       SUBJECTIVE:   Patient stated .      OBJECTIVE DATA SUMMARY:   Critical Behavior:  Neurologic State: Appropriate for age, Alert, Eyes open spontaneously  Orientation Level: Oriented X4, Appropriate for age  Cognition: Follows commands, Appropriate for age attention/concentration, Impulsive  Safety/Judgement: Awareness of environment     Strength:   Strength:  Within functional limits                       Functional Mobility Training:  Bed Mobility:  Rolling: Independent  Supine to Sit: Independent  Sit to Supine: Independent           Transfers:  Sit to Stand: Independent  Stand to Sit: Independent        Bed to Chair: Modified independent                    Balance:  Sitting: Intact  Standing: Intact  Ambulation/Gait Training:  Distance (ft): 150 Feet (ft)  Assistive Device: Gait belt  Ambulation - Level of Assistance: Modified independent                      Stairs:  Number of Stairs Trained: 4  Stairs - Level of Assistance: Modified independent  Rail Use: Left   Neuro Re-Education:     Therapeutic Exercises:      Pain:  Pain Scale 1: Numeric (0 - 10)  Pain Intensity 1: 0              Activity Tolerance:   Fair- requires supplemental O2  Please refer to the flowsheet for vital signs taken during this treatment.   After treatment:   [X]  Patient left in no apparent distress sitting up in chair  [ ]  Patient left in no apparent distress in bed  [X]  Call bell left within reach  [X]  Nursing notified  [X]  Caregiver present  [ ]  Bed alarm activated      COMMUNICATION/COLLABORATION:   The patients plan of care was discussed with: Registered Nurse     Ap Simon, PT, DPT   Time Calculation: 25 mins

## 2017-05-24 NOTE — PROGRESS NOTES
General Surgery Daily Progress Note    Patient: Fly Rubin MRN: [id number]  SSN: [id number]    YOB: 1940  Age: 69 y.o. Sex: female      Admit Date: 5/12/2017    Subjective:   Feeling well, no complaints. Tolerating diet. Had loose BM yesterday. Current Facility-Administered Medications   Medication Dose Route Frequency    diazePAM (VALIUM) injection 2.5 mg  2.5 mg IntraVENous Q6H PRN    enoxaparin (LOVENOX) injection 40 mg  40 mg SubCUTAneous Q24H    lidocaine (LIDODERM) 5 % patch 1 Patch  1 Patch TransDERmal Q24H    oxyCODONE IR (ROXICODONE) tablet 2.5 mg  2.5 mg Oral Q4H PRN    oxyCODONE IR (ROXICODONE) tablet 5 mg  5 mg Oral Q4H PRN    HYDROmorphone (PF) (DILAUDID) injection 0.5 mg  0.5 mg IntraVENous Q4H PRN    ondansetron (ZOFRAN) injection 4 mg  4 mg IntraVENous Q6H PRN    amLODIPine (NORVASC) tablet 5 mg  5 mg Oral DAILY    pantoprazole (PROTONIX) tablet 40 mg  40 mg Oral ACB    gabapentin (NEURONTIN) capsule 600 mg  600 mg Oral TID    hydroCHLOROthiazide (HYDRODIURIL) tablet 25 mg  25 mg Oral DAILY    levothyroxine (SYNTHROID) tablet 50 mcg  50 mcg Oral ACB    acetaminophen (TYLENOL) tablet 650 mg  650 mg Oral TIDAC    venlafaxine-SR (EFFEXOR-XR) capsule 150 mg  150 mg Oral DAILY WITH BREAKFAST        Objective:      05/22 1901 - 05/24 0700  In: 1248.8 [P.O.:350; I.V.:898.8]  Out: 15 [Drains:15]  Patient Vitals for the past 8 hrs:   BP Temp Pulse Resp SpO2   05/24/17 0746 123/60 98.2 °F (36.8 °C) 74 17 93 %   05/24/17 0406 129/65 98 °F (36.7 °C) 70 17 96 %       Physical Exam:  General: Alert, cooperative, NAD  Lungs: Clear to auscultation bilaterally  Heart:  Regular rate and rhythm  Abdomen: Soft, ATTP, non-distended. + bowel sounds. Incisions c/d/i. LLQ wound packed, scant purulent drainage. VALERIA drain SS.    Extremities: Warm, moves all, no edema  Skin:  Warm and dry, no rash    Labs:   Recent Labs      05/24/17   0302   WBC  6.7   HGB  13.8   HCT  40.6   PLT  474* Recent Labs      05/23/17   0630   NA  136   K  3.9   CL  100   CO2  26   GLU  102*   BUN  6   CREA  0.78   CA  9.2   MG  1.7       Assessment / Plan:   · 5/12 lap right colectomy  · Severe Acute Malnutrition as evidenced by decreased caloric intake, and weight loss  · CT findings 5/17 raise concern for small anastomotic leak but clinically she is well  · Tolerating diet  · D/c VALERIA  · Continue wound care  · RD consult; Ensure Clear supplements  · Appreciate oncology evaluation  · Plan for discharge today on 1 week ABX. We will see her in the office tomorrow and discuss when is is appropriate for her to return to [place] [id number] Notified that patient desats to 86% on room air when ambulating. She has no prior pulmonary diagnosis. I suspect this is from deconditioning and atelectasis. Check CXR and will ask RT to see for more aggressive deep breathing exercises and reassess.

## 2017-05-25 ENCOUNTER — HOME CARE VISIT (OUTPATIENT)
Dept: SCHEDULING | Facility: HOME HEALTH | Age: 77
End: 2017-05-25

## 2017-05-25 VITALS
DIASTOLIC BLOOD PRESSURE: 63 MMHG | BODY MASS INDEX: 27.48 KG/M2 | WEIGHT: 139.99 LBS | OXYGEN SATURATION: 92 % | HEART RATE: 80 BPM | SYSTOLIC BLOOD PRESSURE: 141 MMHG | TEMPERATURE: 98.5 F | RESPIRATION RATE: 18 BRPM | HEIGHT: 60 IN

## 2017-05-25 LAB — CGA SERPL-SCNC: 2 NMOL/L (ref 0–5)

## 2017-05-25 PROCEDURE — 74011250637 HC RX REV CODE- 250/637: Performed by: SURGERY

## 2017-05-25 PROCEDURE — 97116 GAIT TRAINING THERAPY: CPT

## 2017-05-25 PROCEDURE — 74011250637 HC RX REV CODE- 250/637: Performed by: PHYSICIAN ASSISTANT

## 2017-05-25 PROCEDURE — 97530 THERAPEUTIC ACTIVITIES: CPT

## 2017-05-25 PROCEDURE — 94762 N-INVAS EAR/PLS OXIMTRY CONT: CPT

## 2017-05-25 PROCEDURE — 74011250636 HC RX REV CODE- 250/636: Performed by: PHYSICIAN ASSISTANT

## 2017-05-25 RX ORDER — AMOXICILLIN AND CLAVULANATE POTASSIUM 875; 125 MG/1; MG/1
1 TABLET, FILM COATED ORAL
Status: COMPLETED | OUTPATIENT
Start: 2017-05-25 | End: 2017-05-25

## 2017-05-25 RX ADMIN — PANTOPRAZOLE SODIUM 40 MG: 40 TABLET, DELAYED RELEASE ORAL at 06:35

## 2017-05-25 RX ADMIN — ENOXAPARIN SODIUM 40 MG: 40 INJECTION SUBCUTANEOUS at 08:22

## 2017-05-25 RX ADMIN — AMOXICILLIN AND CLAVULANATE POTASSIUM 1 TABLET: 875; 125 TABLET, FILM COATED ORAL at 15:10

## 2017-05-25 RX ADMIN — HYDROCHLOROTHIAZIDE 25 MG: 25 TABLET ORAL at 08:22

## 2017-05-25 RX ADMIN — OXYCODONE HYDROCHLORIDE 2.5 MG: 5 TABLET ORAL at 14:30

## 2017-05-25 RX ADMIN — ACETAMINOPHEN 650 MG: 325 TABLET ORAL at 06:36

## 2017-05-25 RX ADMIN — ACETAMINOPHEN 650 MG: 325 TABLET ORAL at 12:12

## 2017-05-25 RX ADMIN — LEVOTHYROXINE SODIUM 50 MCG: 0.05 TABLET ORAL at 06:36

## 2017-05-25 RX ADMIN — GABAPENTIN 600 MG: 300 CAPSULE ORAL at 08:21

## 2017-05-25 RX ADMIN — VENLAFAXINE HYDROCHLORIDE 150 MG: 150 CAPSULE, EXTENDED RELEASE ORAL at 08:22

## 2017-05-25 RX ADMIN — AMLODIPINE BESYLATE 5 MG: 5 TABLET ORAL at 08:21

## 2017-05-25 NOTE — PROGRESS NOTES
[facility] PROGRESS NOTE  Pulmonary, Critical Care, and Sleep Medicine    Name: Janora Primrose MRN: [id number]   : 1940 Hospital: [address] [facility]   Date: 2017  Admission Date: 2017     Chart and notes reviewed. Data reviewed. I review the patient's current medications in the medical record at each encounter.  I have evaluated and examined the patient. .     Overnight events reviewed:  Afebrile  SPO2 95% RA  Overnight oximetry showed SPO2 <89% for 2 mins, deviation from baseline of 1.3%, no need for overnight oximetry  CXR yesterday: Mild diffuse interstitial opacities    ROS: Feels well this morning. Sitting up in chair eating breakfast. Denies cough, fevers, chills, hemoptysis. She is ready to go home. She will be going back to [place] upon discharge. Vital Signs:  Visit Vitals    /58    Pulse 74    Temp 98.5 °F (36.9 °C)    Resp 18    Ht 5' (1.524 m)    Wt 63.5 kg (139 lb 15.9 oz)    SpO2 95%    Breastfeeding No    BMI 27.34 kg/m2       O2 Device: Room air   O2 Flow Rate (L/min): 4 l/min   Temp (24hrs), Av °F (36.7 °C), Min:97.6 °F (36.4 °C), Max:98.5 °F (36.9 °C)       Intake/Output:   Last shift:         Last 3 shifts:  1901 -  0700  In: 480 [P.O.:480]  Out: 5 [Drains:5]    Intake/Output Summary (Last 24 hours) at 17 0858  Last data filed at 17 9627   Gross per 24 hour   Intake                0 ml   Output                5 ml   Net               -5 ml        Physical Exam:   General:  Alert, cooperative, no distress, appears stated age. Head:  Normocephalic, without obvious abnormality, atraumatic. Eyes:  Conjunctivae/corneas clear. PERRL, EOMs intact. Nose: Nares normal. Septum midline. Mucosa normal. No drainage or sinus tenderness.    Throat: Lips, mucosa, and tongue normal. Teeth and gums normal.   Neck: Supple, symmetrical, trachea midline, no adenopathy, thyroid: no enlargment/tenderness/nodules, no carotid bruit and no JVD. Lungs:   Clear to auscultation bilaterally, faint rales to bilateral bases. Chest wall:  No tenderness or deformity. Heart:  Regular rate and rhythm, S1, S2 normal, no murmur, click, rub or gallop. Abdomen:   Soft, non-tender. Bowel sounds normal. No masses,  No organomegaly. S/p right colectomy. Extremities: Extremities normal, atraumatic, no cyanosis or edema. Pulses: 2+ and symmetric all extremities. Skin: Skin color, texture, turgor normal. No rashes or lesions   Lymph nodes: Cervical, supraclavicular, and axillary nodes normal.   Neurologic: Grossly nonfocal     DATA:  MAR reviewed and pertinent medications noted or modified as needed    Labs:  Recent Labs      05/24/17   0302  05/23/17   0014   WBC  6.7  8.6   HGB  13.8  14.0   HCT  40.6  38.7   PLT  474*  471*     Recent Labs      05/23/17   0630   NA  136   K  3.9   CL  100   CO2  26   GLU  102*   BUN  6   CREA  0.78   CA  9.2   MG  1.7       Imaging:  I have personally reviewed the patients radiographs and reports. IMPRESSION:   · Exertional Hypoxia-exercise oximetry shows desaturation to 86% RA, stabilized to 97% on 2LNC  · ILD- Pulmonary Fibrosis  · Former Tobacco User  · S/p Right Colectomy      PLAN:   · Home with oxygen for exertional hypoxia. · Overnight oximetry showed no need for overnight oxygen. · Recs for outpatient: HRCT, PFTs, serologic screen for collagen vascular disease  · Cont ambulation, OOB  · IS-up to 1750mL    Dr. Philomena Talley spoke with Dr. Joshua Rios. PCP in [place] in regards to recommendations-ok to d/c home once f/u plan clarified w/ PCP. Ms. Gordo Santiago is stable from a Pulmonary standpoint. We will sign off and be available for questions as needed. Thank you for allowing us to participate in Ms. Mtz's care.        Susan Ferreira NP

## 2017-05-25 NOTE — PROGRESS NOTES
Nutrition Assessment:    RECOMMENDATIONS/INTERVENTION(S):   Diet per Surgery    RD to adjust ONS d/t pt likes/dislikes/intakes  · Start Ensure pudding (dago) BID    ASSESSMENT:   5/25:  Diet advanced to Regular, low Fiber + Ensure Enlive TID 5/23. Last BM 5/25. Pulm c/s for hypoxia on exertion. Pt to follow up outpt. Visited pt this morning. Sitting up in bed.  at bedside. Tolerating diet w/ good PO, ~75%. Not drinking Ensure Enlive. Agreeable to Ensure pudding (dago). Labs/meds reviewed. 5/22:  Diet advanced to CLD today. No plans for anastomotic leak surgery. Last BM 5/22. 10# wt loss in 1 wk - significant 6.7% decrease. Pt reports sipping only water over the weekend. Ensure Clear caused vomiting. Not agreeable to any ONS. Pt meets criteria for malnutrition. Labs/meds reviewed. Elevated BG. D5 IVF. Meets Criteria for Severe Acute Malnutrition as evidenced by:   [] Moderate muscle wasting, loss of subcutaneous fat   [x] Nutritional intake of <50% of recommended intake for >5 days   [x] Weight loss of >1-2% in 1 week, >5% in 1 month, >7.5% in 3 months, or >10% in 6 months   [] Moderate-severe edema        5/19:  Follow-up. Pt continues w/ abd pain & distention. Per surgery, no fever & bowel function continues -  no plans for intervention. Continue w/ sips of Ensure TID & IVF (D5 @ 75 ml/hr). Per pt, she has not been drinking the Ensure Clear. Has been sipping water only. Encouraged PO of Ensure Clear over water for some nutritional benefit - pt pleasant & agreeable. Inadequate nutrition x 5 days. Minimal nutrition x 2 days. 5/18:  Pt seen for LOS. 69 yof admitted for appendicitis. POD #6 lap right colectomy. Diet was advanced to Regular, low fiber x 3 days post-op w/ fair PO. Spoke w/ RN, abd distention started yesterday. S/p CT showing concern for anastomotic leak - pt made NPO last night. Close observation, ABX. D5 w/ KCl @ 75 infusing.   MD ordered Ensure Clear TID - sips. Called to have Ensure Clear delivered to pt room. Visited pt. Pt denies wt loss or decreased PO PTA. Unable to state UBW but did say \"I love to eat - I want a filet vipin. \"  MD notes pt reports of abd pain x 7 days PTA. Overwt per BMI. No wt hx. No signs of muscle wasting or SQ fat loss per NFPA conducted. No edema noted. Labs/meds reviewed. Lytes requiring repletion. SUBJECTIVE/OBJECTIVE:     Diet Order: Regular, low Fiber, Ensure Enlive TID  % Eaten:    Patient Vitals for the past 72 hrs:   % Diet Eaten   05/24/17 0851 75 %   05/23/17 0921 75 %     Pertinent Medications: [x] Reviewed - protonix, zofran    Chemistries:  Lab Results   Component Value Date/Time    Sodium 136 05/23/2017 06:30 AM    Potassium 3.9 05/23/2017 06:30 AM    Chloride 100 05/23/2017 06:30 AM    CO2 26 05/23/2017 06:30 AM    Anion gap 10 05/23/2017 06:30 AM    Glucose 102 05/23/2017 06:30 AM    BUN 6 05/23/2017 06:30 AM    Creatinine 0.78 05/23/2017 06:30 AM    BUN/Creatinine ratio 8 05/23/2017 06:30 AM    GFR est AA >60 05/23/2017 06:30 AM    GFR est non-AA >60 05/23/2017 06:30 AM    Calcium 9.2 05/23/2017 06:30 AM    AST (SGOT) 29 05/12/2017 03:13 PM    Alk. phosphatase 43 05/12/2017 03:13 PM    Protein, total 8.3 05/12/2017 03:13 PM    Albumin 3.5 05/12/2017 03:13 PM    Globulin 4.8 05/12/2017 03:13 PM    A-G Ratio 0.7 05/12/2017 03:13 PM    ALT (SGPT) 30 05/12/2017 03:13 PM      Anthropometrics: Height: 5' (152.4 cm) Weight: 63.5 kg (139 lb 15.9 oz)    IBW (%IBW): 45.4 kg (100 lb) (149.91 %) UBW (%UBW):   (  %)    BMI: Body mass index is 27.34 kg/(m^2). This BMI is indicative of:   [] Underweight    [] Normal    [x] Overweight    []  Obesity    []  Extreme Obesity (BMI>40)  Estimated Nutrition Needs (Based on): 1418 Kcals/day (RMR (1091) x 1.3 AF) , 74 g (-88 (1.1-1.3 g/kg x actual body wt)) Protein  Carbohydrate:  At Least 130 g/day  Fluids: 1400 mL/day    Last BM: 5/25   [x]Active     []Hyperactive []Hypoactive       [] Absent   BS  Skin:    [] Intact   [x] Incision  [] Breakdown   [] DTI   [] Tears/Excoriation/Abrasion  []Edema [x] Other:      Wt Readings from Last 30 Encounters:   05/24/17 63.5 kg (139 lb 15.9 oz)      NUTRITION DIAGNOSES:   Problem:  Inadequate oral food/beverage intake     Etiology: related to altered GI function      Signs/Symptoms: as evidenced by NPO & s/p lap right colectomy, concern for anastomotic leak      NUTRITION INTERVENTIONS:                General, healthful diet; commercial supplement     GOAL:   Continued tolerance & good PO of diet in the next 3-5 days    Cultural, Catholic, or Ethnic Dietary Needs: None     LEARNING NEEDS (Diet, Food/Nutrient-Drug Interaction):    [x] None Identified   [] Identified and Education Provided/Documented   [] Identified and Pt declined/was not appropriate      [] Interdisciplinary Care Plan Reviewed/Documented    [] Discharge Needs:      [x] No Nutrition Related Discharge Needs    NUTRITION RISK:   Pt Is At Nutrition Risk  [x]     No Nutrition Risk Identified  []       PT SEEN FOR:    []  MD Consult: []Calorie Count      []Diabetic Diet Education        []Diet Education     []Electrolyte Management     []General Nutrition Management and Supplements     []Management of Tube Feeding     []TPN Recommendations    []  RN Referral:  []MST score >=2     []Enteral/Parenteral Nutrition PTA     []Pregnant: Gestational DM or Multigestation                 [] Pressure Ulcer    []  Low BMI      []  Length of Stay       [] Dysphagia Diet         [] Ventilator  [x]  Follow-up     Previous Recommendations:   [x] Implemented    [x] Progressing Towards Goal            [] Not Implemented          [] Not Applicable    Previous Goal:   [] Met              [x] Progressing Towards Goal              [] Not Progressing Towards Goal   [] Not Applicable            Toñito Tran, [address]   Pager [phone]

## 2017-05-25 NOTE — PROGRESS NOTES
General Surgery Daily Progress Note    Patient: Ame House MRN: [id number]  SSN: [id number]    YOB: 1940  Age: 69 y.o. Sex: female      Admit Date: 5/12/2017    Subjective:   Denies N/V, SOB. Current Facility-Administered Medications   Medication Dose Route Frequency    diazePAM (VALIUM) injection 2.5 mg  2.5 mg IntraVENous Q6H PRN    enoxaparin (LOVENOX) injection 40 mg  40 mg SubCUTAneous Q24H    lidocaine (LIDODERM) 5 % patch 1 Patch  1 Patch TransDERmal Q24H    oxyCODONE IR (ROXICODONE) tablet 2.5 mg  2.5 mg Oral Q4H PRN    oxyCODONE IR (ROXICODONE) tablet 5 mg  5 mg Oral Q4H PRN    HYDROmorphone (PF) (DILAUDID) injection 0.5 mg  0.5 mg IntraVENous Q4H PRN    ondansetron (ZOFRAN) injection 4 mg  4 mg IntraVENous Q6H PRN    amLODIPine (NORVASC) tablet 5 mg  5 mg Oral DAILY    pantoprazole (PROTONIX) tablet 40 mg  40 mg Oral ACB    gabapentin (NEURONTIN) capsule 600 mg  600 mg Oral TID    hydroCHLOROthiazide (HYDRODIURIL) tablet 25 mg  25 mg Oral DAILY    levothyroxine (SYNTHROID) tablet 50 mcg  50 mcg Oral ACB    acetaminophen (TYLENOL) tablet 650 mg  650 mg Oral TIDAC    venlafaxine-SR (EFFEXOR-XR) capsule 150 mg  150 mg Oral DAILY WITH BREAKFAST        Objective:      05/23 1901 - 05/25 0700  In: 480 [P.O.:480]  Out: 5 [Drains:5]  Patient Vitals for the past 8 hrs:   BP Temp Pulse Resp SpO2   05/25/17 0815 128/58 98.5 °F (36.9 °C) 74 18 95 %   05/25/17 0318 114/71 98.1 °F (36.7 °C) 81 16 95 %       Physical Exam:  General: Alert, cooperative, NAD  Lungs: Clear to auscultation bilaterally  Heart:  Regular rate and rhythm  Abdomen: Soft, ATTP, non-distended. + bowel sounds. Incisions c/d/i. LLQ wound packed, scant purulent drainage. VALERIA drain SS.    Extremities: Warm, moves all, no edema  Skin:  Warm and dry, no rash    Labs:   Recent Labs      05/24/17   0302   WBC  6.7   HGB  13.8   HCT  40.6   PLT  474*     Recent Labs      05/23/17   0630   NA  136   K  3.9   CL  100   CO2  26 GLU  102*   BUN  6   CREA  0.78   CA  9.2   MG  1.7       Assessment / Plan:   · 5/12 lap right colectomy  · CT findings 5/17 raise concern for small anastomotic leak but clinically she is well  · Tolerating diet  · D/c VALERIA  · Continue wound care  · RD consult; Ensure Clear supplements  · Appreciate oncology evaluation  · Exertional hypoxia noted during PT yesterday. She has not had an oxygen requirement this hospitalization and denies pulmonary hx. Pulmonology evaluated yesterday and suspect pulmonary fibrosis and chronic exertional hypoxia. She had no nocturnal hypoxia. She is adamant about returning home to [facility] at discharge. We will touch base with her PCP to arrange appropriate follow up.

## 2017-05-25 NOTE — PROGRESS NOTES
Patient handed a copy of instructions and1 script which were all read and explained to her with her  at her side. Patient informed nurse that she has called her PCP in [place] for a follow up appointment.  Verbalized understanding

## 2017-05-25 NOTE — DISCHARGE INSTRUCTIONS
Patient Discharge Instructions    Lu Knapp / [id number] : 1940    Admitted 2017 Discharged: 2017     Take Home Medications       · It is important that you take the medication exactly as they are prescribed. · Keep your medication in the bottles provided by the pharmacist and keep a list of the medication names, dosages, and times to be taken in your wallet. · Do not take other medications without consulting your doctor. · Do not drive, drink alcohol, or operate machinery when taking sedating pain medication. · Take colace daily while on pain medication to help prevent constipation. You may take over the counter laxatives such as Dulcolax or Miralax as needed for constipation      What to do at Home    Recommended diet: Regular Diet    Recommended activity: No heavy lifting or strenuous activity for 4 weeks. You may shower. You may drive once pain has improved and you no longer require pain medication. Follow up with Dr. Jenny Lyons next week for recheck. Once you return to [facility], you should follow up with your family doctor for recheck. Call Dr. Jenny Lyons or go to the ER if you develop worsening pain, fever, vomiting, or any other symptoms that concern you. Information obtained by :  I understand that if any problems occur once I am at home I am to contact my physician. I understand and acknowledge receipt of the instructions indicated above.                                                                                                                                            Physician's or R.N.'s Signature                                                                  Date/Time                                                                                                                                              Patient or Representative Signature                                                          Date/Time

## 2019-07-24 NOTE — ED NOTES
After pt ambulated back from restroom O2 sats decreased to 85-89% on room air. Pt placed on 2L NC. YOSEF Garcia told. Pt denies any SOB with walking.
Pt 94% on room air at this time.
Pt ambulates back from restroom with steady gait.
Pt ambulates to restroom with steady gait.
Pt to OR at this time.
TRANSFER - OUT REPORT:    Verbal report given to BAM Moore on Fahad Span  being transferred to the OR for ordered procedure       Report consisted of patients Situation, Background, Assessment and   Recommendations(SBAR). Information from the following report(s) SBAR, Kardex, ED Summary, MAR, Recent Results, Med Rec Status and Cardiac Rhythm ST was reviewed with the receiving nurse. Lines:   Peripheral IV 05/12/17 Right Forearm (Active)   Site Assessment Clean, dry, & intact 5/12/2017  3:12 PM   Phlebitis Assessment 0 5/12/2017  3:12 PM   Infiltration Assessment 0 5/12/2017  3:12 PM   Dressing Status Clean, dry, & intact 5/12/2017  3:12 PM   Dressing Type Tape;Transparent 5/12/2017  3:12 PM   Hub Color/Line Status Pink;Patent; Flushed 5/12/2017  3:12 PM   Action Taken Blood drawn 5/12/2017  3:12 PM   Alcohol Cap Used Yes 5/12/2017  3:12 PM        Opportunity for questions and clarification was provided.
53 y/o F presented to ED with c/o back pain and numbness to feet, patient actually has decreased sensation on exam not numbness.  MRI obtained with disc protrusion and S1 nerve root.  Has pain management procedure EDSI, scheduled for Friday.  When patient given results and told she was to be d/c home she began cursing and became verbally abusive.  She was informed that she needs to see s/w her pain management to have her medications adjusted.

## 2023-08-31 NOTE — ANESTHESIA POSTPROCEDURE EVALUATION
Post-Anesthesia Evaluation and Assessment    Patient: Javi Acosta MRN: [id number]  SSN: [id number]    YOB: 1940  Age: 69 y.o. Sex: female       Cardiovascular Function/Vital Signs  Visit Vitals    /64    Pulse 95    Temp 36.6 °C (97.8 °F)    Resp 16    Ht 5' (1.524 m)    Wt 67.1 kg (147 lb 14.9 oz)    SpO2 94%    BMI 28.89 kg/m2       Patient is status post general anesthesia for Procedure(s):  LAPAROSCOPIC TAKE DOWN OF SPLENIC FLEXURE  AND RIGHT COLECTOMY. Nausea/Vomiting: None    Postoperative hydration reviewed and adequate. Pain:  Pain Scale 1: Numeric (0 - 10) (05/12/17 2352)  Pain Intensity 1: 0 (05/12/17 2352)   Managed    Neurological Status:   Neuro (WDL): Exceptions to WDL (05/12/17 2307)  Neuro  Neurologic State: Drowsy; Pharmacologically induced (comment) (05/12/17 2307)  Orientation Level: Oriented X4 (05/12/17 1506)  Cognition: Appropriate decision making (05/12/17 1506)  Speech: Clear (05/12/17 1506)  LUE Motor Response: Purposeful (05/12/17 1506)  LLE Motor Response: Purposeful (05/12/17 1506)  RUE Motor Response: Purposeful (05/12/17 1506)  RLE Motor Response: Purposeful (05/12/17 1506)   At baseline    Mental Status and Level of Consciousness: Arousable    Pulmonary Status:   O2 Device: Nasal cannula (05/12/17 2352)   Adequate oxygenation and airway patent    Complications related to anesthesia: None    Post-anesthesia assessment completed.  No concerns    Signed By: Leanna Judge MD     May 13, 2017 Never smoker

## (undated) DEVICE — BLUNT TIP LAPAROSCOPIC SEALER/DIVIDER: Brand: LIGASURE

## (undated) DEVICE — SUTURE V-LOC 90 SZ 3-0 L6IN ABSRB VLT V-20 L26MM 1/2 CIR VLOCM0604

## (undated) DEVICE — BLADELESS OPTICAL TROCAR WITH FIXATION CANNULA: Brand: VERSAPORT

## (undated) DEVICE — SPECIMEN RETRIEVAL POUCH: Brand: ENDO CATCH GOLD

## (undated) DEVICE — SUTURE PDS II SZ 0 L27IN ABSRB VLT L26MM CT-2 1/2 CIR Z334H

## (undated) DEVICE — BLADE ASSEMB CLP HAIR FINE --

## (undated) DEVICE — (D)STRIP SKN CLSR 0.5X4IN WHT --

## (undated) DEVICE — STERILE POLYISOPRENE POWDER-FREE SURGICAL GLOVES: Brand: PROTEXIS

## (undated) DEVICE — ARTICULATING RELOAD WITH TRI-STAPLE TECHNOLOGY: Brand: ENDO GIA

## (undated) DEVICE — TRAY CATH 16FR DRN BG LF -- CONVERT TO ITEM 363158

## (undated) DEVICE — Device

## (undated) DEVICE — INFECTION CONTROL KIT SYS

## (undated) DEVICE — 3M™ DURAPORE™ SURGICAL TAPE 1538-3, 3 INCH X 10 YARD (7,5CM X 9,1M), 4 ROLLS/BOX: Brand: 3M™ DURAPORE™

## (undated) DEVICE — 3000CC GUARDIAN II: Brand: GUARDIAN

## (undated) DEVICE — (D)SYR 10ML 1/5ML GRAD NSAF -- PKGING CHANGE USE ITEM 338027

## (undated) DEVICE — SOL IRRIGATION INJ NACL 0.9% 500ML BTL

## (undated) DEVICE — UNIVERSAL FIXATION CANNULA: Brand: VERSAONE

## (undated) DEVICE — NEEDLE HYPO 22GA L1.5IN BLK S STL HUB POLYPR SHLD REG BVL

## (undated) DEVICE — SUTURE MCRYL SZ 4-0 L27IN ABSRB UD L19MM PS-2 1/2 CIR PRIM Y426H

## (undated) DEVICE — COVER LT HNDL BLU PLAS

## (undated) DEVICE — KENDALL SCD EXPRESS SLEEVES, KNEE LENGTH, MEDIUM: Brand: KENDALL SCD

## (undated) DEVICE — GOWN,SIRUS,FABRNF,XL,20/CS: Brand: MEDLINE

## (undated) DEVICE — TUBING INSUFLTN 10FT LUER -- CONVERT TO ITEM 368568

## (undated) DEVICE — REM POLYHESIVE ADULT PATIENT RETURN ELECTRODE: Brand: VALLEYLAB

## (undated) DEVICE — (D)PREP SKN CHLRAPRP APPL 26ML -- CONVERT TO ITEM 371833

## (undated) DEVICE — SUTURE SZ 0 27IN 5/8 CIR UR-6  TAPER PT VIOLET ABSRB VICRYL J603H

## (undated) DEVICE — TELFA NON-ADHERENT ABSORBENT DRESSING: Brand: TELFA

## (undated) DEVICE — BLADELESS OPTICAL TROCAR WITH FIXATION CANNULA: Brand: VERSAONE

## (undated) DEVICE — 3M™ TEGADERM™ TRANSPARENT FILM DRESSING FRAME STYLE, 1624W, 2-3/8 IN X 2-3/4 IN (6 CM X 7 CM), 100/CT 4CT/CASE: Brand: 3M™ TEGADERM™

## (undated) DEVICE — DEVON™ KNEE AND BODY STRAP 60" X 3" (1.5 M X 7.6 CM): Brand: DEVON

## (undated) DEVICE — DEVICE TRNSF SPIK STL 2008S] MICROTEK MEDICAL INC]

## (undated) DEVICE — DRAIN SURG WND 15 FR RND TIP SIL STRL LF DISP

## (undated) DEVICE — SURGICAL PROCEDURE KIT GEN LAPAROSCOPY LF